# Patient Record
Sex: MALE | Race: WHITE | HISPANIC OR LATINO
[De-identification: names, ages, dates, MRNs, and addresses within clinical notes are randomized per-mention and may not be internally consistent; named-entity substitution may affect disease eponyms.]

---

## 2022-10-17 PROBLEM — Z00.00 ENCOUNTER FOR PREVENTIVE HEALTH EXAMINATION: Status: ACTIVE | Noted: 2022-10-17

## 2022-10-24 ENCOUNTER — APPOINTMENT (OUTPATIENT)
Dept: CARDIOLOGY | Facility: CLINIC | Age: 77
End: 2022-10-24

## 2022-10-24 VITALS — HEIGHT: 67 IN | BODY MASS INDEX: 25.27 KG/M2 | WEIGHT: 161 LBS

## 2022-10-24 PROCEDURE — 99214 OFFICE O/P EST MOD 30 MIN: CPT

## 2022-10-24 NOTE — PHYSICAL EXAM
[Normal Venous Pressure] : normal venous pressure [Normal S1, S2] : normal S1, S2 [Murmur] : murmur [Clear Lung Fields] : clear lung fields [Soft] : abdomen soft [de-identified] : myesha

## 2022-10-24 NOTE — HISTORY OF PRESENT ILLNESS
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 \par \par 3/22: HDL 30 AND ldL 59 pt denies any symptoms prior to bypass. \par \par pt says biking during the summer 5 6 miles 2 times a week on flat ground, pt had no issues with this. pt wife with alzheimers and not getting aides to work with her.

## 2022-11-07 ENCOUNTER — NON-APPOINTMENT (OUTPATIENT)
Age: 77
End: 2022-11-07

## 2023-02-01 ENCOUNTER — APPOINTMENT (OUTPATIENT)
Dept: CARDIOLOGY | Facility: CLINIC | Age: 78
End: 2023-02-01

## 2023-02-24 ENCOUNTER — APPOINTMENT (OUTPATIENT)
Dept: CARDIOLOGY | Facility: CLINIC | Age: 78
End: 2023-02-24
Payer: MEDICARE

## 2023-02-24 VITALS — DIASTOLIC BLOOD PRESSURE: 80 MMHG | SYSTOLIC BLOOD PRESSURE: 120 MMHG | HEART RATE: 80 BPM

## 2023-02-24 VITALS — HEIGHT: 67 IN | BODY MASS INDEX: 25.74 KG/M2 | WEIGHT: 164 LBS

## 2023-02-24 PROCEDURE — 99214 OFFICE O/P EST MOD 30 MIN: CPT

## 2023-02-24 RX ORDER — SIMVASTATIN 20 MG/1
20 TABLET, FILM COATED ORAL
Refills: 0 | Status: DISCONTINUED | COMMUNITY
End: 2023-02-24

## 2023-02-24 NOTE — HISTORY OF PRESENT ILLNESS
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 \par \par 3/22: HDL 30 AND ldL 59 pt denies any symptoms prior to bypass. \par \par pt says biking during the summer 5 6 miles 2 times a week on flat ground, pt had no issues with this. pt wife with alzheimers and not getting aides to work with her.  \par \par 2/24/23: pt did not get echo done, pt not sure why, pt says bikes in march, pt uses a cane to walk and mostly sedantary now. pt has old ypass grafts. \par pt takes care of wife with alzheimers. \par  and HDL 31 start atorvastatin ,pt stopped simvastatin on his own

## 2023-02-24 NOTE — DISCUSSION/SUMMARY
[FreeTextEntry1] : ldl 59 and HDl 30 target 40 \par pt says exercising but did not repeat bloodwork \par \par f/u in 4 months\par \par 2/24/23: will get copy of bloodwork \par get lexiscan stress nuclear as old bypass grafts \par get 2 d echo

## 2023-02-24 NOTE — PHYSICAL EXAM
[Normal Venous Pressure] : normal venous pressure [Normal S1, S2] : normal S1, S2 [Murmur] : murmur [Clear Lung Fields] : clear lung fields [Soft] : abdomen soft [de-identified] : myesha

## 2023-03-20 ENCOUNTER — OUTPATIENT (OUTPATIENT)
Dept: OUTPATIENT SERVICES | Facility: HOSPITAL | Age: 78
LOS: 1 days | End: 2023-03-20
Payer: MEDICARE

## 2023-03-20 ENCOUNTER — RESULT REVIEW (OUTPATIENT)
Age: 78
End: 2023-03-20

## 2023-03-20 DIAGNOSIS — E78.2 MIXED HYPERLIPIDEMIA: ICD-10-CM

## 2023-03-20 DIAGNOSIS — Z00.8 ENCOUNTER FOR OTHER GENERAL EXAMINATION: ICD-10-CM

## 2023-03-20 PROCEDURE — 78452 HT MUSCLE IMAGE SPECT MULT: CPT | Mod: 26

## 2023-03-20 PROCEDURE — A9500: CPT

## 2023-03-20 PROCEDURE — 78452 HT MUSCLE IMAGE SPECT MULT: CPT

## 2023-03-20 PROCEDURE — 93018 CV STRESS TEST I&R ONLY: CPT

## 2023-03-21 DIAGNOSIS — E78.2 MIXED HYPERLIPIDEMIA: ICD-10-CM

## 2023-03-30 DIAGNOSIS — I25.10 ATHEROSCLEROTIC HEART DISEASE OF NATIVE CORONARY ARTERY WITHOUT ANGINA PECTORIS: ICD-10-CM

## 2023-03-31 DIAGNOSIS — I25.10 ATHEROSCLEROTIC HEART DISEASE OF NATIVE CORONARY ARTERY WITHOUT ANGINA PECTORIS: ICD-10-CM

## 2023-06-02 ENCOUNTER — APPOINTMENT (OUTPATIENT)
Dept: CARDIOLOGY | Facility: CLINIC | Age: 78
End: 2023-06-02

## 2023-07-05 ENCOUNTER — APPOINTMENT (OUTPATIENT)
Dept: CARDIOLOGY | Facility: CLINIC | Age: 78
End: 2023-07-05
Payer: MEDICARE

## 2023-07-05 PROCEDURE — 93306 TTE W/DOPPLER COMPLETE: CPT

## 2023-07-10 ENCOUNTER — RX RENEWAL (OUTPATIENT)
Age: 78
End: 2023-07-10

## 2023-07-10 ENCOUNTER — APPOINTMENT (OUTPATIENT)
Dept: CARDIOLOGY | Facility: CLINIC | Age: 78
End: 2023-07-10
Payer: MEDICARE

## 2023-07-10 VITALS — WEIGHT: 159 LBS | BODY MASS INDEX: 24.96 KG/M2 | HEIGHT: 67 IN

## 2023-07-10 VITALS — SYSTOLIC BLOOD PRESSURE: 126 MMHG | DIASTOLIC BLOOD PRESSURE: 70 MMHG | HEART RATE: 85 BPM

## 2023-07-10 DIAGNOSIS — H54.7 UNSPECIFIED VISUAL LOSS: ICD-10-CM

## 2023-07-10 DIAGNOSIS — R94.39 ABNORMAL RESULT OF OTHER CARDIOVASCULAR FUNCTION STUDY: ICD-10-CM

## 2023-07-10 PROCEDURE — 99214 OFFICE O/P EST MOD 30 MIN: CPT

## 2023-07-10 RX ORDER — ASPIRIN 81 MG/1
81 TABLET ORAL
Refills: 0 | Status: ACTIVE | COMMUNITY

## 2023-07-10 RX ORDER — VALSARTAN 40 MG/1
40 TABLET, COATED ORAL DAILY
Qty: 90 | Refills: 3 | Status: ACTIVE | COMMUNITY
Start: 2023-07-10 | End: 1900-01-01

## 2023-07-10 RX ORDER — METOPROLOL SUCCINATE 50 MG/1
50 TABLET, EXTENDED RELEASE ORAL
Qty: 90 | Refills: 3 | Status: ACTIVE | COMMUNITY
Start: 2023-07-10 | End: 1900-01-01

## 2023-07-10 NOTE — CARDIOLOGY SUMMARY
[de-identified] : 3/21/23: There is a medium-sized perfusion defect involving the basal inferior, basal to mid inferolateral and mid anterolateral walls of the left ventricle. On the resting images, this defect is partially reversible suggesting ischemia. LVEF is 60%. This is a low risk positive result.

## 2023-07-10 NOTE — PHYSICAL EXAM
[Normal Venous Pressure] : normal venous pressure [Normal S1, S2] : normal S1, S2 [Murmur] : murmur [Clear Lung Fields] : clear lung fields [Soft] : abdomen soft [de-identified] : myesha

## 2023-07-10 NOTE — HISTORY OF PRESENT ILLNESS
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 LOW HDL, LVH, LVEF 44%\par \par 3/22: HDL 30 AND ldL 59 pt denies any symptoms prior to bypass. \par \par pt says biking during the summer 5 6 miles 2 times a week on flat ground, pt had no issues with this. pt wife with alzheimers and not getting aides to work with her.  \par \par 2/24/23: pt did not get echo done, pt not sure why, pt says bikes in march, pt uses a cane to walk and mostly sedantary now. pt has old bypass grafts. \par pt takes care of wife with alzheimers. \par  and HDL 31 start atorvastatin ,pt stopped simvastatin on his own \par \par 7/10/23:\par 3/21/23: There is a medium-sized perfusion defect involving the basal inferior, basal to mid inferolateral and mid anterolateral walls of the left ventricle. On the resting images, this defect is PARTIALLY REVERSIBLE ischemia. LVEF is 60%. This is a low risk positive result.\par 7/5/23: LVEF 44%, e wave: 0.5 m/s, e' sept: 0.05 m/s, E/e': 11.3 CO: 5.1 l/min LAE, AMVL prolapse, eccentric mr LVH \par Pt using cane for bad knees, pt does not walk fast. pt not sob but not very active.

## 2023-07-10 NOTE — DISCUSSION/SUMMARY
[FreeTextEntry1] : ldl 59 and HDl 30 target 40 \par \par \par 7/10/23: pt with abnormal stress test small area of ischemia with old grafts, \par low e wave increase hydration \par start beta blockers if patient agrees d/w patient at length. \par get bloodwork \par f/u in 4 months \par pt only drinking 4 glasses/day and told to increase hydration. \par start valsartan 40 mg po daily and metoprolol er 50 mg po qd as lv dysfunction \par if symptoms will get cath.

## 2023-11-01 ENCOUNTER — APPOINTMENT (OUTPATIENT)
Dept: CARDIOLOGY | Facility: CLINIC | Age: 78
End: 2023-11-01
Payer: MEDICARE

## 2023-11-01 PROCEDURE — 93880 EXTRACRANIAL BILAT STUDY: CPT

## 2023-11-07 ENCOUNTER — APPOINTMENT (OUTPATIENT)
Dept: CARDIOLOGY | Facility: CLINIC | Age: 78
End: 2023-11-07
Payer: MEDICARE

## 2023-11-07 VITALS — BODY MASS INDEX: 27.47 KG/M2 | HEIGHT: 67 IN | WEIGHT: 175 LBS

## 2023-11-07 PROCEDURE — 99214 OFFICE O/P EST MOD 30 MIN: CPT

## 2023-12-05 ENCOUNTER — APPOINTMENT (OUTPATIENT)
Dept: CARDIOLOGY | Facility: CLINIC | Age: 78
End: 2023-12-05

## 2024-03-25 ENCOUNTER — APPOINTMENT (OUTPATIENT)
Dept: CARDIOLOGY | Facility: CLINIC | Age: 79
End: 2024-03-25

## 2024-03-25 DIAGNOSIS — Z91.199 PATIENT'S NONCOMPLIANCE WITH OTHER MEDICAL TREATMENT AND REGIMEN DUE TO UNSPECIFIED REASON: ICD-10-CM

## 2024-03-25 DIAGNOSIS — I51.7 CARDIOMEGALY: ICD-10-CM

## 2024-03-25 DIAGNOSIS — I25.2 OLD MYOCARDIAL INFARCTION: ICD-10-CM

## 2024-03-25 DIAGNOSIS — E11.9 TYPE 2 DIABETES MELLITUS W/OUT COMPLICATIONS: ICD-10-CM

## 2024-03-25 DIAGNOSIS — I51.9 HEART DISEASE, UNSPECIFIED: ICD-10-CM

## 2024-03-25 DIAGNOSIS — I10 ESSENTIAL (PRIMARY) HYPERTENSION: ICD-10-CM

## 2024-03-25 DIAGNOSIS — I25.810 ATHEROSCLEROSIS OF CORONARY ARTERY BYPASS GRAFT(S) W/OUT ANGINA PECTORIS: ICD-10-CM

## 2024-03-25 DIAGNOSIS — E78.2 MIXED HYPERLIPIDEMIA: ICD-10-CM

## 2024-03-25 DIAGNOSIS — I65.23 OCCLUSION AND STENOSIS OF BILATERAL CAROTID ARTERIES: ICD-10-CM

## 2024-03-25 NOTE — HISTORY OF PRESENT ILLNESS
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 LOW HDL, USES A CANE OA, MODERATE CAROTID DZ   3/22: HDL 30 AND ldL 59 pt denies any symptoms prior to bypass.  pt says biking during the summer 5 to 6 miles 2 times a week on flat ground,  pt wife with alzheimers and not getting aides to work with her.     and HDL 31 start atorvastatin ,pt stopped simvastatin on his own   7/10/23: 3/21/23: nuclear: PARTIALLY REVERSIBLE ischemia. LVEF is 60%. This is a low risk positive result. 7/5/23: LVEF 44%, e wave: 0.5 m/s, e' sept: 0.05 m/s, E/e': 11.3 CO: 5.1 l/min LAE, AMVL prolapse, eccentric mr LVH  Pt using cane for bad knees, pt does not walk fast. pt not sob but not very active.   11/7/23: 11/1/23: Carotid: less than 50% b/l ica, moderate intraluminal irregular calcified and noncalcified plaque DAVID, elevated velocity right ECA.  pt on valsartan and bp is improved today, pt walked 15 minutes to get here sweating and feels ok.  pt tolerating atorvastatin with no problems.   3/25/24: NS on 12/5 patient was supposed to f/u in one month to f/u bloodwork to optimize meds  labs

## 2024-03-25 NOTE — PHYSICAL EXAM
[Normal Venous Pressure] : normal venous pressure [Normal S1, S2] : normal S1, S2 [Murmur] : murmur [Clear Lung Fields] : clear lung fields [Soft] : abdomen soft [de-identified] : myesha

## 2024-03-25 NOTE — DISCUSSION/SUMMARY
[FreeTextEntry1] : pt with abnormal stress test small area of ischemia with old grafts,  low e wave 23 increase hydration  pt only drinking 4 glasses/day  continue metoprolol er 50 mg po qd as lv dysfunction  if symptoms will get cath.  d/w pt at length about noncompliance  pt needs LDL less than 55  continue atorvastatin 20 mg po qhs  continue valsartan 40  continue aspirin 81  continue metoprolol er 50  get bloodwork 2 D echo f/u in 4 months

## 2024-03-25 NOTE — CARDIOLOGY SUMMARY
[de-identified] : 3/21/23: There is a medium-sized perfusion defect involving the basal inferior, basal to mid inferolateral and mid anterolateral walls of the left ventricle. On the resting images, this defect is partially reversible suggesting ischemia. LVEF is 60%. This is a low risk positive result.

## 2024-04-23 ENCOUNTER — RX RENEWAL (OUTPATIENT)
Age: 79
End: 2024-04-23

## 2024-04-23 RX ORDER — ATORVASTATIN CALCIUM 20 MG/1
20 TABLET, FILM COATED ORAL
Qty: 90 | Refills: 3 | Status: ACTIVE | COMMUNITY
Start: 2023-02-24 | End: 1900-01-01

## 2024-07-12 ENCOUNTER — INPATIENT (INPATIENT)
Facility: HOSPITAL | Age: 79
LOS: 2 days | Discharge: HOME HEALTH | DRG: 184 | End: 2024-07-15
Attending: SURGERY | Admitting: SURGERY
Payer: MEDICARE

## 2024-07-12 VITALS
OXYGEN SATURATION: 98 % | RESPIRATION RATE: 16 BRPM | WEIGHT: 175.05 LBS | HEART RATE: 79 BPM | SYSTOLIC BLOOD PRESSURE: 181 MMHG | DIASTOLIC BLOOD PRESSURE: 104 MMHG | TEMPERATURE: 98 F

## 2024-07-12 DIAGNOSIS — Z95.1 PRESENCE OF AORTOCORONARY BYPASS GRAFT: Chronic | ICD-10-CM

## 2024-07-12 LAB
ALBUMIN SERPL ELPH-MCNC: 4.5 G/DL — SIGNIFICANT CHANGE UP (ref 3.5–5.2)
ALP SERPL-CCNC: 53 U/L — SIGNIFICANT CHANGE UP (ref 30–115)
ALT FLD-CCNC: 26 U/L — SIGNIFICANT CHANGE UP (ref 0–41)
ANION GAP SERPL CALC-SCNC: 16 MMOL/L — HIGH (ref 7–14)
APPEARANCE UR: CLEAR — SIGNIFICANT CHANGE UP
AST SERPL-CCNC: 29 U/L — SIGNIFICANT CHANGE UP (ref 0–41)
BASOPHILS # BLD AUTO: 0.06 K/UL — SIGNIFICANT CHANGE UP (ref 0–0.2)
BASOPHILS NFR BLD AUTO: 0.5 % — SIGNIFICANT CHANGE UP (ref 0–1)
BILIRUB SERPL-MCNC: 0.5 MG/DL — SIGNIFICANT CHANGE UP (ref 0.2–1.2)
BILIRUB UR-MCNC: NEGATIVE — SIGNIFICANT CHANGE UP
BUN SERPL-MCNC: 17 MG/DL — SIGNIFICANT CHANGE UP (ref 10–20)
CALCIUM SERPL-MCNC: 9.5 MG/DL — SIGNIFICANT CHANGE UP (ref 8.4–10.5)
CHLORIDE SERPL-SCNC: 103 MMOL/L — SIGNIFICANT CHANGE UP (ref 98–110)
CO2 SERPL-SCNC: 19 MMOL/L — SIGNIFICANT CHANGE UP (ref 17–32)
COLOR SPEC: YELLOW — SIGNIFICANT CHANGE UP
CREAT SERPL-MCNC: 1.2 MG/DL — SIGNIFICANT CHANGE UP (ref 0.7–1.5)
DIFF PNL FLD: NEGATIVE — SIGNIFICANT CHANGE UP
EGFR: 62 ML/MIN/1.73M2 — SIGNIFICANT CHANGE UP
EOSINOPHIL # BLD AUTO: 0.01 K/UL — SIGNIFICANT CHANGE UP (ref 0–0.7)
EOSINOPHIL NFR BLD AUTO: 0.1 % — SIGNIFICANT CHANGE UP (ref 0–8)
GLUCOSE SERPL-MCNC: 173 MG/DL — HIGH (ref 70–99)
GLUCOSE UR QL: 100 MG/DL
HCT VFR BLD CALC: 40.2 % — LOW (ref 42–52)
HGB BLD-MCNC: 13.7 G/DL — LOW (ref 14–18)
IMM GRANULOCYTES NFR BLD AUTO: 0.6 % — HIGH (ref 0.1–0.3)
KETONES UR-MCNC: ABNORMAL MG/DL
LEUKOCYTE ESTERASE UR-ACNC: NEGATIVE — SIGNIFICANT CHANGE UP
LYMPHOCYTES # BLD AUTO: 1.05 K/UL — LOW (ref 1.2–3.4)
LYMPHOCYTES # BLD AUTO: 9.1 % — LOW (ref 20.5–51.1)
MCHC RBC-ENTMCNC: 30.8 PG — SIGNIFICANT CHANGE UP (ref 27–31)
MCHC RBC-ENTMCNC: 34.1 G/DL — SIGNIFICANT CHANGE UP (ref 32–37)
MCV RBC AUTO: 90.3 FL — SIGNIFICANT CHANGE UP (ref 80–94)
MONOCYTES # BLD AUTO: 0.52 K/UL — SIGNIFICANT CHANGE UP (ref 0.1–0.6)
MONOCYTES NFR BLD AUTO: 4.5 % — SIGNIFICANT CHANGE UP (ref 1.7–9.3)
NEUTROPHILS # BLD AUTO: 9.77 K/UL — HIGH (ref 1.4–6.5)
NEUTROPHILS NFR BLD AUTO: 85.2 % — HIGH (ref 42.2–75.2)
NITRITE UR-MCNC: NEGATIVE — SIGNIFICANT CHANGE UP
NRBC # BLD: 0 /100 WBCS — SIGNIFICANT CHANGE UP (ref 0–0)
PH UR: 6 — SIGNIFICANT CHANGE UP (ref 5–8)
PLATELET # BLD AUTO: 199 K/UL — SIGNIFICANT CHANGE UP (ref 130–400)
PMV BLD: 11.3 FL — HIGH (ref 7.4–10.4)
POTASSIUM SERPL-MCNC: 5.2 MMOL/L — HIGH (ref 3.5–5)
POTASSIUM SERPL-SCNC: 5.2 MMOL/L — HIGH (ref 3.5–5)
PROT SERPL-MCNC: 7.1 G/DL — SIGNIFICANT CHANGE UP (ref 6–8)
PROT UR-MCNC: SIGNIFICANT CHANGE UP MG/DL
RBC # BLD: 4.45 M/UL — LOW (ref 4.7–6.1)
RBC # FLD: 13.6 % — SIGNIFICANT CHANGE UP (ref 11.5–14.5)
SODIUM SERPL-SCNC: 138 MMOL/L — SIGNIFICANT CHANGE UP (ref 135–146)
SP GR SPEC: >1.03 — HIGH (ref 1–1.03)
UROBILINOGEN FLD QL: 0.2 MG/DL — SIGNIFICANT CHANGE UP (ref 0.2–1)
WBC # BLD: 11.48 K/UL — HIGH (ref 4.8–10.8)
WBC # FLD AUTO: 11.48 K/UL — HIGH (ref 4.8–10.8)

## 2024-07-12 PROCEDURE — 99285 EMERGENCY DEPT VISIT HI MDM: CPT

## 2024-07-12 PROCEDURE — 72125 CT NECK SPINE W/O DYE: CPT | Mod: 26,MC

## 2024-07-12 PROCEDURE — 74177 CT ABD & PELVIS W/CONTRAST: CPT | Mod: 26,MC

## 2024-07-12 PROCEDURE — 71260 CT THORAX DX C+: CPT | Mod: 26,MC

## 2024-07-12 PROCEDURE — 70450 CT HEAD/BRAIN W/O DYE: CPT | Mod: 26,MC

## 2024-07-12 RX ORDER — MORPHINE SULFATE 100 MG/1
4 TABLET, EXTENDED RELEASE ORAL ONCE
Refills: 0 | Status: DISCONTINUED | OUTPATIENT
Start: 2024-07-12 | End: 2024-07-12

## 2024-07-12 RX ORDER — METOPROLOL TARTRATE 50 MG
50 TABLET ORAL DAILY
Refills: 0 | Status: DISCONTINUED | OUTPATIENT
Start: 2024-07-12 | End: 2024-07-13

## 2024-07-12 RX ORDER — ATORVASTATIN CALCIUM 20 MG/1
20 TABLET, FILM COATED ORAL AT BEDTIME
Refills: 0 | Status: DISCONTINUED | OUTPATIENT
Start: 2024-07-12 | End: 2024-07-15

## 2024-07-12 RX ORDER — TAMSULOSIN HYDROCHLORIDE 0.4 MG/1
0.8 CAPSULE ORAL AT BEDTIME
Refills: 0 | Status: DISCONTINUED | OUTPATIENT
Start: 2024-07-12 | End: 2024-07-13

## 2024-07-12 RX ORDER — VALSARTAN 320 MG/1
40 TABLET ORAL DAILY
Refills: 0 | Status: DISCONTINUED | OUTPATIENT
Start: 2024-07-12 | End: 2024-07-13

## 2024-07-12 RX ADMIN — MORPHINE SULFATE 4 MILLIGRAM(S): 100 TABLET, EXTENDED RELEASE ORAL at 19:46

## 2024-07-12 NOTE — CONSULT NOTE ADULT - ASSESSMENT
Assessment & Plan    78y Male  s/p mechanical fall with R 5-10 rib fractures, and R renal hemorrhagic lesion.    NEURO:  #Acute pain    -Tylenol 650 q6 ATC    -Lidocaine patch    -Robaxin 500 q8    -Gabapentin 100 q8    -Dilaudid 0.25 q4 prn for moderate pain    -Dilaudid 0.5 q4 prn for severe pain     RESP:   #Acute displaced R 5-10 rib fractures     -Pain control     -Encourage IS, currently pulling 1L on IS    -Respiratory monitoring  #Oxygenation    -Saturating well on RA  #Activity    -increase as tolerated    CARDS:   #Hx HTN    -Continue home valsartan 40 qD     -Continue home metoprolol  25 BID (home 50mg ER)    -Follows with cardiology Dr. Carrasco  #Hx CABG x4 2010    -Non-compliant with ASA    -By Dr. Castillo   #Hx HLD    -Continue atorvastatin 20mg qD  #Hx MI  Rx-metoprolol tartrate 25 BID  valsartan 40 daily    GI/NUTR:   #Diet except meds  #GI Prophylaxis    -not indicated  #Bowel regimen    -senna    -last bowel movement unknown    /RENAL:   #R renal hemorrhagic lesion versus traumatic lesional rupture    -Hemoglobin monitoring q6     -UOP/ creatinine monitoring   #Hx BPH    -Continue alfuzosin 10mg     -Follows outpatient with Dr. Charles- urology   #Hyperkalemia on admission    -Insulin/dextrose, repeat BMP in AM    Labs:   BUN/Cr- 17/1.2  -->  Electrolytes-(07-12 @ 19:52)Na 138 // K 5.2 // Mg -- // Phos --   #maintain euvolemia    -LR @120             HEME/ONC:   #DVT prophylaxis    -Holding until stable Hgb      Labs: Hb/Hct:  13.7/40.2  (07-12 @ 19:52)  -->                      Plts:  199  -->                 PTT/INR:        ID:  #leukocytosis   WBC- 11.48  --->>  Temp trend- 24hrs T(F): 99.6 (07-13 @ 00:08), Max: 99.6 (07-13 @ 00:08)  Current antibiotics- n/a      ENDO:  #Hx DM    -Holding home Metformin    -FSG q6 while NPO    -Glucose goal 140-180    -ISS    MSK:     Activity - Ambulate as Tolerated:     Time/Priority:  Routine (07-13-24 @ 01:12) [Active]        LINES/DRAINS:  PIV    ADVANCED DIRECTIVES:  Full Code    HCP/Emergency Contact-    INDICATION FOR SICU: Respiratory monitoring in the setting of R rib rib fractures 5-10, R renal hemorrhagic lesion     DISPO:   SICU. Case discussed with attending Dr. Ravi Assessment & Plan    78y Male  s/p mechanical fall with R 5-10 rib fractures, and R renal hemorrhagic lesion.    NEURO:  #Acute pain    -Tylenol 650 q6 ATC    -Lidocaine patch    -Robaxin 500 q8    -Gabapentin 100 q8    -Dilaudid 0.25 q4 prn for moderate pain    -Dilaudid 0.5 q4 prn for severe pain     RESP:   #Acute displaced R 5-10 rib fractures     -Pain control     -Encourage IS, currently pulling 1L on IS    -Respiratory monitoring  #Oxygenation    -Saturating well on RA  #Activity    -increase as tolerated    CARDS:   #Hx HTN    -Continue home valsartan 40 qD     -Continue home metoprolol  25 BID (home 50mg ER)    -Follows with cardiology Dr. Carrasco  #Hx CABG x4 2010    -Non-compliant with ASA    -By Dr. Castillo   #Hx HLD    -Continue atorvastatin 20mg qD  #Hx MI  Rx-metoprolol tartrate 25 BID  valsartan 40 daily    GI/NUTR:   #Diet NPO except meds  #GI Prophylaxis    -not indicated  #Bowel regimen    -senna    -last bowel movement unknown    /RENAL:   #R renal hemorrhagic lesion versus traumatic lesional rupture    -Hemoglobin monitoring q6     -UOP/ creatinine monitoring   #Hx BPH    -Continue alfuzosin 10mg     -Follows outpatient with Dr. Charles- urology   #Hyperkalemia on admission    -Insulin/dextrose, repeat BMP in AM    Labs:   BUN/Cr- 17/1.2  -->  Electrolytes-(07-12 @ 19:52)Na 138 // K 5.2 // Mg -- // Phos --   #maintain euvolemia    -LR @120             HEME/ONC:   #DVT prophylaxis    -Holding until stable Hgb      Labs: Hb/Hct:  13.7/40.2  (07-12 @ 19:52)  -->                      Plts:  199  -->                 PTT/INR:        ID:  #leukocytosis   WBC- 11.48  --->>  Temp trend- 24hrs T(F): 99.6 (07-13 @ 00:08), Max: 99.6 (07-13 @ 00:08)  Current antibiotics- n/a      ENDO:  #Hx DM    -Holding home Metformin    -FSG q6 while NPO    -Glucose goal 140-180    -ISS    MSK:     Activity - Ambulate as Tolerated:     Time/Priority:  Routine (07-13-24 @ 01:12) [Active]        LINES/DRAINS:  PIV    ADVANCED DIRECTIVES:  Full Code    HCP/Emergency Contact-    INDICATION FOR SICU: Respiratory monitoring in the setting of R rib rib fractures 5-10, R renal hemorrhagic lesion     DISPO:   SICU. Case discussed with attending Dr. Ravi Assessment & Plan    78y Male  s/p mechanical fall with R 5-10 rib fractures, and R renal hemorrhagic lesion.    NEURO:  #Acute pain    -Tylenol 650 q6 ATC    -Lidocaine patch    -Robaxin 500 q8    -Gabapentin 100 q8    -Dilaudid 0.25 q4 prn for moderate pain    -Dilaudid 0.5 q4 prn for severe pain     RESP:   #Acute displaced R 5-10 rib fractures     -Pain control     -Encourage IS, currently pulling 1L on IS    -Respiratory monitoring  #Oxygenation    -Saturating well on RA  #Activity    -increase as tolerated    CARDS:   #Hx HTN    -Continue home valsartan 40 qD     -Continue home metoprolol  25 BID (home 50mg ER)    -Follows with cardiology Dr. Carrasco  #Hx CABG x4 2010    -Non-compliant with ASA    -By Dr. Castillo   #Hx HLD    -Continue atorvastatin 20mg qD  #Hx MI  Rx-metoprolol tartrate 25 BID  valsartan 40 daily    GI/NUTR:   #Diet NPO except meds  #GI Prophylaxis    -not indicated  #Bowel regimen    -senna    -last bowel movement unknown    /RENAL:   #R renal hemorrhagic lesion versus traumatic lesional rupture    -Hemoglobin monitoring q6     -UOP/ creatinine monitoring   #Hx BPH    -Continue alfuzosin 10mg (interchange with tamsulosin)    -Follows outpatient with Dr. Charles- urology   #Hyperkalemia on admission    -Insulin/dextrose, repeat BMP in AM    Labs:   BUN/Cr- 17/1.2  -->  Electrolytes-(07-12 @ 19:52)Na 138 // K 5.2 // Mg -- // Phos --   #maintain euvolemia    -LR @120             HEME/ONC:   #DVT prophylaxis    -Holding until stable Hgb      Labs: Hb/Hct:  13.7/40.2  (07-12 @ 19:52)  -->                      Plts:  199  -->                 PTT/INR:        ID:  #leukocytosis   WBC- 11.48  --->>  Temp trend- 24hrs T(F): 99.6 (07-13 @ 00:08), Max: 99.6 (07-13 @ 00:08)  Current antibiotics- n/a      ENDO:  #Hx DM    -Holding home Metformin    -FSG q6 while NPO    -Glucose goal 140-180    -ISS    MSK:     Activity - Ambulate as Tolerated:     Time/Priority:  Routine (07-13-24 @ 01:12) [Active]        LINES/DRAINS:  PIV    ADVANCED DIRECTIVES:  Full Code    HCP/Emergency Contact-    INDICATION FOR SICU: Respiratory monitoring in the setting of R rib rib fractures 5-10, R renal hemorrhagic lesion     DISPO:   SICU. Case discussed with attending Dr. Ravi

## 2024-07-12 NOTE — H&P ADULT - ATTENDING COMMENTS
Trauma Attending H&P Attestation    Patient seen and evaluated with the trauma team in the trauma bay upon arrival. All pertinent labs and radiographic imaging reviewed, pending final reports. Outpatient medications reviewed, including the presence of anticoagulants, if applicable. I agree with the resident's note above, including the physical exam findings, assessment and plan as documented with the following adjustments.     Trauma Level: [ ] Code  [ ] Alert  [x ] Consult [ ] Transfer in  Patient is seen at the bedside at 22:31  Activation by:  [x ] ED physician [ ] EMS  Intubated in Field? [ ] Yes [x ] No  Intubated in ED? [ ] Yes [x ] No  Intubated in Trauma [place]? [ ] Yes [x ] No    ALEXUS [place] Patient is a 78y old  Male who presents with a chief complaint of S/P slipped and  Fall in the shower and sustained - Multiple Rib Fractures as well right renal cyst rapture with peripheric hematoma   Patient presented with GCS [15 ] AAOx3 upon arrival to the trauma bay.    Allergies  No Known Allergies  Intolerances    PAST MEDICAL & SURGICAL HISTORY:  Diabetes  HTN (hypertension)  HLD (hyperlipidemia)  Acute myocardial infarction  Blindness of right eye  S/P CABG x 4    On AC/Antiplatelets [ ] Yes [x ] No              [ ] NOVACs, [ ] Coumadin, [ ] ASA, [ ] Antiplatelets     Vital Signs Last 24 Hrs  T(C): 36.7 (13 Jul 2024 12:00), Max: 37.6 (13 Jul 2024 00:08)  T(F): 98.1 (13 Jul 2024 12:00), Max: 99.6 (13 Jul 2024 00:08)  HR: 75 (13 Jul 2024 12:00) (60 - 88)  BP: 143/67 (13 Jul 2024 12:00) (112/64 - 184/87)  BP(mean): 91 (13 Jul 2024 12:00) (83 - 127)  RR: 24 (13 Jul 2024 12:00) (16 - 27)  SpO2: 99% (13 Jul 2024 12:00) (96% - 100%)    Parameters below as of 13 Jul 2024 12:00  Patient On (Oxygen Delivery Method): room air    PE: right chest wall tenderness with bruising   Assessment: mechanical fall from standing                             right side closed multiple ribs fractures                             right renal cyst rapture with peripheric hematoma due to trauma                              DM                             HTN                             Frail elderly  PLAN  - Admit to Trauma Service/SICU  - supportive care  - GI/DVT prophylaxis  - pain management multimodal  - Hg/Hct monitoring  - repeat studies as needed  - complete and follow up on trauma work up included but not limites to                          [ x] CXR [x ] PXR [ x] Extremities X-RAYs                          [x ] NCHCT [x ] C-Spine CT [x ] CT Chest [x ] CT Abdomen/Pelvis  [x ] FAST [ ] Other                          [x ] Trauma Labs   [ ] Toxicology    I independently read and reviewed the above studies -> multiple ribs fractures non displaced right side, right renal cyst rapture with small perinephric hematoma,    - Follow up Consults  [ ] Neurosurgery [ ] Orthopaedics [ ] Plastics [ ] Fascial/OMFS [ ] Opthalmology   [ ] Urology  [ ] ENT  [ ] Pediatric ICU                                         [x ] SICU/SDU [ ] Burn/Burn ICU  [x ] Medicine [ ] Geriatrics [ ] Cardiology/EP [ ] Hospice/Palliative Care  [ ] Physical Therapy  - IV ABx give as indicated [ ] Yes [ x] No  - Tetanus given as indicated [ ] Yes [x ] No  - Seizures prophylaxis  [ ] Yes,  [x ] No  - follow up UA for micro hematuria    Bianca Ravi MD, FACS  Trauma/ACS/Surgical Critical Care Attending

## 2024-07-12 NOTE — ED PROVIDER NOTE - PHYSICAL EXAMINATION
CONSTITUTIONAL: Well-appearing; well-nourished; in no apparent distress.   EYES: PERRL; EOM intact.   ENT: normal nose; no rhinorrhea; normal pharynx with no tonsillar hypertrophy.   NECK: Supple; non-tender; no cervical lymphadenopathy.   CARDIOVASCULAR: Normal S1, S2; no murmurs, rubs, or gallops. Equal radial pulses  RESPIRATORY: Normal chest excursion with respiration; breath sounds clear and equal bilaterally; no wheezes, rhonchi, or rales.  GI/: Normal bowel sounds; non-distended; non-tender; no palpable organomegaly.   MS: No evidence of trauma or deformity. no midline spinal ttp. + ttp to rt lateral robs. No crepitus. Normal ROM in all four extremities; non-tender to palpation; distal pulses are normal.   SKIN: Normal for age and race; warm; dry; good turgor; no apparent lesions or exudate.   NEURO/PSYCH: A & O x 4; grossly unremarkable. mood and manner are appropriate. No focal deficits.

## 2024-07-12 NOTE — ED ADULT NURSE NOTE - TEMPLATE LIST FOR HEAD TO TOE ASSESSMENT
Quality 130: Documentation Of Current Medications In The Medical Record: Current Medications Documented Detail Level: Detailed Quality 226: Preventive Care And Screening: Tobacco Use: Screening And Cessation Intervention: Patient screened for tobacco use, is a smoker AND received Cessation Counseling General

## 2024-07-12 NOTE — H&P ADULT - ASSESSMENT
ASSESSMENT:  78yM w/ PMHx of DM, HTN, HLD, MI, 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 +-HT, -LOC, -AC. Trauma assessment in ED: ABCs intact , GCS 15 , AAOx3. Pt states around 3pm he was in the shower when he slipped and fell on soap. Pt states he fell on his right side hitting the edge of the bathtub. Pt states he immediately felt " the wind come out of him" and states he had trouble breathing. Workup in the ED included a CT Chest which revealed acute displaced fractures of the right fifth through 10th lateral ribs. CT scan also shows a right hemorrhagic ruptured renal cyst. Trauma surgery was consulted for evaluation and management of multiple rib fractures.     PLAN:   - Admit to SICU under Dr. Ravi for management of acute displaced fractures of the right fifth through 10th lateral ribs  - Daily AM chest x-ray  - Encourage Incentive spirometer   - Pain Management   - K 5.2, Lok[place] TID   - Continue home medications   - DVT ppx   - Regular diet    Disposition pending results of above labs and imaging  Above plan discussed with Trauma attending, Dr. Ravi, patient, patient family, and ED team  --------------------------------------------------------------------------------------  07-12-24 @ 23:01

## 2024-07-12 NOTE — ED ADULT NURSE NOTE - SUICIDE SCREENING QUESTION 2
Keshav De Leon and Dr. Keshav Guevara at bedside for pelvic exam     Dorina Evans, RN  05/12/22 0894 No

## 2024-07-12 NOTE — H&P ADULT - HISTORY OF PRESENT ILLNESS
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 +-HT, -LOC, -AC. Trauma assessment in ED: ABCs intact , GCS 15 , AAOx3. Pt states around 3pm he was in the shower when he slipped and fell on soap. Pt states he fell on his right side hitting the edge of the bathtub. Pt states he immediately felt " the wind come out of him" and states he had trouble breathing. Patient was able to get up right after the fall and call for help. Patient lives with his wife. Pt states he     ROS: 10-system review is otherwise negative except HPI above.      Primary Survey:    A - airway intact  B - bilateral breath sounds and good chest rise  C - palpable pulses in all extremities  D - GCS 15 on arrival, RAZA  Exposure obtained    Vital Signs Last 24 Hrs  T(C): 36.7 (12 Jul 2024 18:28), Max: 36.7 (12 Jul 2024 18:28)  T(F): 98 (12 Jul 2024 18:28), Max: 98 (12 Jul 2024 18:28)  HR: 79 (12 Jul 2024 18:28) (79 - 79)  BP: 181/104 (12 Jul 2024 18:28) (181/104 - 181/104)  BP(mean): --  RR: 16 (12 Jul 2024 18:28) (16 - 16)  SpO2: 98% (12 Jul 2024 18:28) (98% - 98%)    Parameters below as of 12 Jul 2024 18:28  Patient On (Oxygen Delivery Method): room air      Secondary Survey:   General: NAD  HEENT: Normocephalic, atraumatic, EOMI, PEERLA. no scalp lacerations   Neck: Soft, midline trachea. no c-spine tenderness  Chest: No chest wall tenderness, no subcutaneous emphysema   Cardiac: S1, S2, RRR  Respiratory: Bilateral breath sounds, clear and equal bilaterally  Abdomen: Soft, non-distended, non-tender, no rebound, no guarding.  Groin: Normal appearing, pelvis stable   Ext:  Moving b/l upper and lower extremities. Palpable Radial b/l UE, b/l DP palpable in LE.   Back: No T/L/S spine tenderness, No palpable runoff/stepoff/deformity  Rectal: No alyssa blood, JOSUE with good tone    FAST: *****/Negative    ACCESS / DEVICES:  [ ] Peripheral IV  [ ] Central Venous Line	[ ] R	[ ] L	[ ] IJ	[ ] Fem	[ ] SC	Placed:   [ ] Arterial Line		[ ] R	[ ] L	[ ] Fem	[ ] Rad	[ ] Ax	Placed:   [ ] PICC:					[ ] Mediport  [ ] Urinary Catheter,  Date Placed:   [ ] Chest tube: [ ] Right, [ ] Left  [ ] FRANCOISE/Bill Drains    Labs:  CAPILLARY BLOOD GLUCOSE                              13.7   11.48 )-----------( 199      ( 12 Jul 2024 19:52 )             40.2       Auto Neutrophil %: 85.2 % (07-12-24 @ 19:52)  Auto Immature Granulocyte %: 0.6 % (07-12-24 @ 19:52)    07-12    138  |  103  |  17  ----------------------------<  173<H>  5.2<H>   |  19  |  1.2      Calcium: 9.5 mg/dL (07-12-24 @ 19:52)      LFTs:             7.1  | 0.5  | 29       ------------------[53      ( 12 Jul 2024 19:52 )  4.5  | x    | 26          Lipase:x      Amylase:x             Coags:            Urinalysis Basic - ( 12 Jul 2024 22:47 )    Color: Yellow / Appearance: Clear / SG: >1.030 / pH: x  Gluc: x / Ketone: Trace mg/dL  / Bili: Negative / Urobili: 0.2 mg/dL   Blood: x / Protein: Trace mg/dL / Nitrite: Negative   Leuk Esterase: Negative / RBC: x / WBC x   Sq Epi: x / Non Sq Epi: x / Bacteria: x                RADIOLOGY & ADDITIONAL STUDIES:  ---------------------------------------------------------------------------------------    ASSESSMENT:  78y Male  w/ PMHx of *** seen as (Code Trauma / Trauma Alert / Trauma Consult) s/p ****** with complaint of *** , external signs of trauma include *** . Trauma assessment in ED: ABCs intact , GCS 15 , AAOx3,  RAZA.     Injuries identified:   -   -   -     PLAN:   - Trauma Labs: (CBC, BMP, Coags, T&S, UA, EtOH level)  Additional studies:  EKG  Utox    Trauma Imaging to include the following:  - CXR, Pelvic Xray  - CT Head,  CT C-spine, CT Max/Face, CT Chest, CT Abd/Pelvis  - Extremity films: None    Additional consultations:  - Neurosurgery  - Orthopedics  - OMFS  - PT/Rehab/SW  - Hospitalist/Medicine     Disposition pending results of above labs and imaging  Above plan discussed with Trauma attending,  ***  , patient, patient family, and ED team  --------------------------------------------------------------------------------------  07-12-24 @ 23:01 TRAUMA ACTIVATION LEVEL: ALERT  ACTIVATED BY: ED  INTUBATED: NO    MECHANISM OF INJURY:   [X] Fall	    GCS: 15 	E: 4	V: 5	M: 6    HPI:  78yM w/ PMHx of DM, HTN, HLD, MI, 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 +-HT, -LOC, -AC. Trauma assessment in ED: ABCs intact , GCS 15 , AAOx3. Pt states around 3pm he was in the shower when he slipped and fell on soap. Pt states he fell on his right side hitting the edge of the bathtub. Pt states he immediately felt " the wind come out of him" and states he had trouble breathing. Patient was able to get up right after the fall and call for help. Patient lives with his wife. Pt denies any headache, dizziness, nausea, vomiting, chest pain, or any other symptoms.      ROS: 10-system review is otherwise negative except HPI above.      Primary Survey:    A - airway intact  B - bilateral breath sounds and good chest rise  C - palpable pulses in all extremities  D - GCS 15 on arrival, RAZA  Exposure obtained    Vital Signs Last 24 Hrs  T(C): 36.7 (12 Jul 2024 18:28), Max: 36.7 (12 Jul 2024 18:28)  T(F): 98 (12 Jul 2024 18:28), Max: 98 (12 Jul 2024 18:28)  HR: 79 (12 Jul 2024 18:28) (79 - 79)  BP: 181/104 (12 Jul 2024 18:28) (181/104 - 181/104)  BP(mean): --  RR: 16 (12 Jul 2024 18:28) (16 - 16)  SpO2: 98% (12 Jul 2024 18:28) (98% - 98%)    Parameters below as of 12 Jul 2024 18:28  Patient On (Oxygen Delivery Method): room air      Secondary Survey:   General: NAD  HEENT: Normocephalic, atraumatic, EOMI, PEERLA. no scalp lacerations   Neck: Soft, midline trachea. no c-spine tenderness  Chest: No chest wall tenderness, no subcutaneous emphysema   Cardiac: S1, S2, RRR  Respiratory: Bilateral breath sounds, clear and equal bilaterally  Abdomen: Soft, non-distended, non-tender, no rebound, no guarding.  Groin: Normal appearing, pelvis stable   Ext:  Moving b/l upper and lower extremities. Palpable Radial b/l UE, b/l DP palpable in LE.   Back: No T/L/S spine tenderness, No palpable runoff/stepoff/deformity  Rectal: No alyssa blood, JOSUE with good tone    FAST: *****/Negative    ACCESS / DEVICES:  [ ] Peripheral IV  [ ] Central Venous Line	[ ] R	[ ] L	[ ] IJ	[ ] Fem	[ ] SC	Placed:   [ ] Arterial Line		[ ] R	[ ] L	[ ] Fem	[ ] Rad	[ ] Ax	Placed:   [ ] PICC:					[ ] Mediport  [ ] Urinary Catheter,  Date Placed:   [ ] Chest tube: [ ] Right, [ ] Left  [ ] FRANCOISE/Bill Drains    Labs:  CAPILLARY BLOOD GLUCOSE                              13.7   11.48 )-----------( 199      ( 12 Jul 2024 19:52 )             40.2       Auto Neutrophil %: 85.2 % (07-12-24 @ 19:52)  Auto Immature Granulocyte %: 0.6 % (07-12-24 @ 19:52)    07-12    138  |  103  |  17  ----------------------------<  173<H>  5.2<H>   |  19  |  1.2      Calcium: 9.5 mg/dL (07-12-24 @ 19:52)      LFTs:             7.1  | 0.5  | 29       ------------------[53      ( 12 Jul 2024 19:52 )  4.5  | x    | 26          Lipase:x      Amylase:x             Coags:            Urinalysis Basic - ( 12 Jul 2024 22:47 )    Color: Yellow / Appearance: Clear / SG: >1.030 / pH: x  Gluc: x / Ketone: Trace mg/dL  / Bili: Negative / Urobili: 0.2 mg/dL   Blood: x / Protein: Trace mg/dL / Nitrite: Negative   Leuk Esterase: Negative / RBC: x / WBC x   Sq Epi: x / Non Sq Epi: x / Bacteria: x                RADIOLOGY & ADDITIONAL STUDIES:  ---------------------------------------------------------------------------------------    ASSESSMENT:  78y Male  w/ PMHx of *** seen as (Code Trauma / Trauma Alert / Trauma Consult) s/p ****** with complaint of *** , external signs of trauma include *** . Trauma assessment in ED: ABCs intact , GCS 15 , AAOx3,  RAZA.     Injuries identified:   -   -   -     PLAN:   - Trauma Labs: (CBC, BMP, Coags, T&S, UA, EtOH level)  Additional studies:  EKG  Utox    Trauma Imaging to include the following:  - CXR, Pelvic Xray  - CT Head,  CT C-spine, CT Max/Face, CT Chest, CT Abd/Pelvis  - Extremity films: None    Additional consultations:  - Neurosurgery  - Orthopedics  - OMFS  - PT/Rehab/SW  - Hospitalist/Medicine     Disposition pending results of above labs and imaging  Above plan discussed with Trauma attending,  ***  , patient, patient family, and ED team  --------------------------------------------------------------------------------------  07-12-24 @ 23:01 TRAUMA ACTIVATION LEVEL: ALERT  ACTIVATED BY: ED  INTUBATED: NO    MECHANISM OF INJURY:   [X] Fall	    GCS: 15 	E: 4	V: 5	M: 6    HPI:  78yM w/ PMHx of DM, HTN, HLD, MI, 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 +-HT, -LOC, -AC. Trauma assessment in ED: ABCs intact , GCS 15 , AAOx3. Pt states around 3pm he was in the shower when he slipped and fell on soap. Pt states he fell on his right side hitting the edge of the bathtub. Pt states he immediately felt " the wind come out of him" and states he had trouble breathing. Patient was able to get up right after the fall and call for help. Patient lives with his wife. Pt denies any headache, dizziness, nausea, vomiting, chest pain, or any other symptoms.      ROS: 10-system review is otherwise negative except HPI above.      Primary Survey:    A - airway intact  B - bilateral breath sounds and good chest rise  C - palpable pulses in all extremities  D - GCS 15 on arrival, RAZA  Exposure obtained    Vital Signs Last 24 Hrs  T(C): 36.7 (12 Jul 2024 18:28), Max: 36.7 (12 Jul 2024 18:28)  T(F): 98 (12 Jul 2024 18:28), Max: 98 (12 Jul 2024 18:28)  HR: 79 (12 Jul 2024 18:28) (79 - 79)  BP: 181/104 (12 Jul 2024 18:28) (181/104 - 181/104)  BP(mean): --  RR: 16 (12 Jul 2024 18:28) (16 - 16)  SpO2: 98% (12 Jul 2024 18:28) (98% - 98%)    Parameters below as of 12 Jul 2024 18:28  Patient On (Oxygen Delivery Method): room air      ---------------------------------------------------------------------------------------

## 2024-07-12 NOTE — H&P ADULT - NSHPPHYSICALEXAM_GEN_ALL_CORE
Secondary Survey:   General: NAD  HEENT: Normocephalic, atraumatic, EOMI, PEERLA. no scalp lacerations   Neck: Soft, midline trachea. no c-spine tenderness  Chest: No chest wall tenderness, no subcutaneous emphysema, Tender to palpation in right upper flank.   Cardiac: RRR  Respiratory: Bilateral breath sounds, clear and equal bilaterally  Abdomen: Soft, non-distended, non-tender  Groin: Normal appearing, pelvis stable   Ext:  Moving b/l upper and lower extremities. Palpable Radial b/l UE, b/l DP palpable in LE.   Back: No T/L/S spine tenderness, No palpable runoff/stepoff/deformity

## 2024-07-12 NOTE — ED ADULT NURSE NOTE - OBJECTIVE STATEMENT
Patient is a 78 year old male complaining of rib pain s/p mechanical fall in the bathroom today -AC use, -LOC, -HT

## 2024-07-12 NOTE — H&P ADULT - NSICDXPASTMEDICALHX_GEN_ALL_CORE_FT
PAST MEDICAL HISTORY:  Acute myocardial infarction     Diabetes     HLD (hyperlipidemia)     HTN (hypertension)      PAST MEDICAL HISTORY:  Acute myocardial infarction     Blindness of right eye     Diabetes     HLD (hyperlipidemia)     HTN (hypertension)

## 2024-07-12 NOTE — ED PROVIDER NOTE - ATTENDING APP SHARED VISIT CONTRIBUTION OF CARE
78-year-old male history of hypertension diabetes CAD status post CABG presenting status post mechanical slip and fall in shower.  No head injury, LOC, anticoagulation.  Currently complaining of right-sided rib pain.  Uncomfortable appearing, non toxic. NCAT PERRLA EOMI neck supple non tender normal wob cta bl rrr right anterolateral ribs tenderness palpation, no overlying skin changes abdomen s nt nd no rebound no guarding WWPx4 neuro non focal no midline CTL spine tenderness palpation.

## 2024-07-12 NOTE — ED PROVIDER NOTE - OBJECTIVE STATEMENT
78 year old M with hx of htn, dm, cad s.p cabg presenting to ed with rt sided rib pain s/p fall. Pt sts slipped in the shower at 3pm and hit L side of ribs on bathtub. No head trauma or loc. + pain worse with inspiration and movement. No ac use, chest pain, sob, abd pain, nausea, vomiting, neck or back pain, inability to bare weight or ambulate.

## 2024-07-12 NOTE — CONSULT NOTE ADULT - ATTENDING COMMENTS
Critical Care: [id number]   This patient has a high probability of sudden, clinically significant deterioration, which requires the highest level of physician preparedness to intervene urgently. I managed/supervised life or organ supporting interventions that required frequent physician assessment. I devoted my full attention in the ICU to the direct care of this patient for the period of time indicated below. Time I spent with the family or surrogate(s) is included only if the patient was incapable of providing the necessary information or participating in medical decision making. Time devoted to teaching and to any procedures I billed separately is not included.     CAROLINA VAUGHAN 78y Male admitted to [ x] SICU /[ ] SDU with mechanical fall resulted in multiple right sided ribs fractures, been evaluated for pulmonary contusion, right renal cyst rapture due to trauma with perinephric hematoma. Patient is at risk for hemodynamic instability, airway at risk for obstruction, at risk for increase hemorrhage, electrolytes abnormalities.  Patient is examined and evaluated at the bedside with SICU team. Treatment plan discussed with SICU team, nurses and primary team.   Chest X-ray and all relevant studies reviewed during rounds.  Will continue hemodynamic monitoring as per protocol in SICU.    Neuro:  GCS [15 ] AAOx3  [ x]  Neurovascular checks as per SICU protocol                 [ ] 3% NaCl     [ ] 2% NaCl                [ ] Keppra  [ ] Lamictal  [ ] Depakote  [ ] Dilantin [ x] None                Pharmacological Paralysis [ ] Yes  [ x]  No      Sedated/Pain control with                 [ ] Dilaudid drip, [ ]  Ketamine drip, [ ] Fentanyl drip, [ ] Propofol, [ ] Precedex, [ ] Versed drip, [ ] Ativan drip,                           [ ] OxyContin standing,  [ ] OxyContin PRN, [ ] Dilaudid PRN pushes, [ ] Fentanyl PRN pushes, [ ] PCA,                [x ] Tylenol IV/PO, [ ] Gabapentin, [ ]  Ketorolac, [x ] Tramadol,  [ ] Lidoderm Patch       Other Medications               [ ] Seroquel, [ ] Zyprexa, [ ] Haldol,  [ ] Clonazepam [ ] Xanax, [ ] Versed/Ativan PRN, [ ] Valium [ ] Trazadone [x ] None               [ ] Robaxin   [ ] Baclofen  [ ] Flexeril               [ ] CIWA (Ativan/Valium/ Librium)  CV: continue to monitor, continue home medications  On pressors [ ]  Yes  [x ]  No          [ ]  Levophed, [ ] Hernán-Synephrine, [ ] Vasopressin, [ ]  Epinephrine          [ ] Dobutamine, [ ] Milrinone, [ ]  Midodrine,  [ ] Others    Other Cardiac Meds          [ ] Amiodarone IV/PO, [ ] Digoxin, [ ] Cardizem drip, [ ] Cleviprex drip, [ ] Esmolol drip, [ ] Cardene drip  Respiratory: Acute respiratory insufficiency -> continue monitoring, CXR -> No pneumothorax, lower lobe right side mild contusion                         None Invasive Support  [x ] Incentive Spirometer 1000ml                                 [ ] BiPAP   [ ] CPAP/NIV   [ ] HFNC   [ ] NR Face Mask  [ x] NC  [ ] Trach Caller [ ] Room Air                        Ventilatory support  [ ] Yes [x ] No                            [ ] SBT                                  [ ] PC    [ ] VC   [ ] AC/PRVC   [ ] BiVent/APRV   [ ]CPAP   GI  [x ]  bowel regiment  [x ] BM none [x ] Flatus none  [ ] Ostomy   [ ] NG tube                 Prophylaxis [x ] PPI  [ ] H2 Blockers  [ ] Others  Nutrition: continue   [x ] Diet  will start in AM if no distress [ ] TPN/PPN   [ ] calories count   [ ]  Tube Feeds     Renal: Continue I&Os monitoring, Herrera catheter  [ ] Yes  [ x]  No  [ ] Consideration for discontinuation [ ] Taxes cath/PrimaFit  [ ] TOV                                                               [ ] HD/CVVH   [x ] Urine output       Lytes/Acid-base: replete hypokalemia, hypomagnesemia, hypocalcemia, hypophosphatemia        IV Fluids   [x ] LR@120ml/hr  [ ] NS  [ ] D5W1/2NS [ ] Bicarbonate Drip  [ ] Albumin [ ] Lasix drip/push  [ ] Bumex drip/push  ID: leukocytosis -> continue to monitor:         IV Abx [ ] Yes, [x ] No;  ID consulted [ ] Yes, [x ] No        Cultures send  [ ] Respiratory   [ ] Blood   [ ] Urine  [ ] Fluids  [ ] Tissue  [x ]  None  Lines:   [ ] Right   [ ] Left  [ ] Bilateral                     [ ] Subclavian TLC        [ ]  Internal Jugular TLC     [ ]  Femoral TLC                     [ ] Subclavian Cordis    [ ] Internal Jugular Cordis   [ ] Femoral Cordis                     [ ] HD catheter    [ ] PICC     [ ]  Midline   [ x] Peripheral IVs                                                 [ ] Right   [ ] Left   [x ] None                     [ ] Radial A-Line    [ ] Femoral A-Line   [ ] Axillary A-Line               Heme: continue to evaluate for acute blood loss anemia- trend Hg/Hct                     AC Reversal Indicated [ ] Yes  [x ] No                                      [ ] Kcentra,  [ ] 3Factors concentrate, [ ] Andexxa                     Transfused  indicated  [ ] Yes, [x ] No    [ ] PRBCs   [ ] Platelets   [ ] FFPs   [ ] Cryoprecipitate                    Should be started on or continued with  following  [ ] Yes,  [x ] No                               [ ] Lovenox  [ ] Coumadin  [ ] Heparin drip  [ ] DOVACs  [ ] ASA  [ ] Antiplatelets   Endocrine: Prevent and treat hyperglycemia with insulin as needed,                                [x ] Sliding scale,  [ ] Lantus/Regular Insuline                              Insuline drip for hyperglycemia [ ] Yes, [x ] No   PV: follow pulse exam  Skin: decub precautions  DVT Prophylaxis:  [x ] SCDs  [ ] Heparin SQ  [ x] Lovenox  SQ  [ ] ASA  Stress Gastritis Prophylaxis: PPI/H2 Blockers if indicated  Mobility: patient is evaluated at the bedside with mobility team and the goals for today are discussed with PT [x ] out of bed to chair    PATIENT/FAMILY/SURROGATE CONFERENCE:  [x ] Yes with patient at the bedside. [ ] No  PURPOSE: To obtain necessary information, To discuss treatment options under consideration today.    I saw and evaluated the patient personally. I have reviewed and agree with note above. Treatment plan discussed with SICU team, nurses and primary team at the time of the multidisciplinary rounds. The above note is NOT written at the time of rounds and will reflect all changes throughout management of the patient for the day note is written for.    Bianca Ravi MD, FACS  Trauma/ACS/SCC Attending

## 2024-07-12 NOTE — ED PROVIDER NOTE - CLINICAL SUMMARY MEDICAL DECISION MAKING FREE TEXT BOX
78-year-old male history of hypertension diabetes CAD status post CABG presenting status post mechanical slip and fall in shower.  No head injury, LOC, anticoagulation.  Currently complaining of right-sided rib pain.  Uncomfortable appearing, non toxic. NCAT PERRLA EOMI neck supple non tender normal wob cta bl rrr right anterolateral ribs tenderness palpation, no overlying skin changes abdomen s nt nd no rebound no guarding WWPx4 neuro non focal no midline CTL spine tenderness palpation. labs imaging reviewed. sp sx/icu eval. will admit for further eval

## 2024-07-12 NOTE — H&P ADULT - NSHPLABSRESULTS_GEN_ALL_CORE
Labs:  CAPILLARY BLOOD GLUCOSE                              13.7   11.48 )-----------( 199      ( 12 Jul 2024 19:52 )             40.2       Auto Neutrophil %: 85.2 % (07-12-24 @ 19:52)  Auto Immature Granulocyte %: 0.6 % (07-12-24 @ 19:52)    07-12    138  |  103  |  17  ----------------------------<  173<H>  5.2<H>   |  19  |  1.2      Calcium: 9.5 mg/dL (07-12-24 @ 19:52)      LFTs:             7.1  | 0.5  | 29       ------------------[53      ( 12 Jul 2024 19:52 )  4.5  | x    | 26          Lipase:x      Amylase:x            Coags:        Urinalysis Basic - ( 12 Jul 2024 22:47 )    Color: Yellow / Appearance: Clear / SG: >1.030 / pH: x  Gluc: x / Ketone: Trace mg/dL  / Bili: Negative / Urobili: 0.2 mg/dL   Blood: x / Protein: Trace mg/dL / Nitrite: Negative   Leuk Esterase: Negative / RBC: x / WBC x   Sq Epi: x / Non Sq Epi: x / Bacteria: x      RADIOLOGY & ADDITIONAL STUDIES:  < from: CT Abdomen and Pelvis w/ IV Cont (07.12.24 @ 21:25) >    ABDOMEN/PELVIS:    Mixed density fluid at the right renal lower pole withfocal area of high   density fluid, likely reflecting blood products. Differential includes a   hemorrhagic lesion versus traumatic lesional rupture. No alyssa renal   laceration identified.    < from: CT Chest w/ IV Cont (07.12.24 @ 21:25) >    CHEST:    Acute, displaced fractures of the right fifth through 10th lateral ribs.   No pneumothorax.    Debris within the left main bronchus which can be seen with aspiration.        < from: CT Cervical Spine No Cont (07.12.24 @ 21:22) >    CT HEAD:  No acute intracranial findings.    CT CERVICAL SPINE:  No acute cervical fracture or dislocation.

## 2024-07-12 NOTE — ED ADULT NURSE NOTE - NSFALLHARMRISKINTERV_ED_ALL_ED

## 2024-07-13 DIAGNOSIS — S22.49XA MULTIPLE FRACTURES OF RIBS, UNSPECIFIED SIDE, INITIAL ENCOUNTER FOR CLOSED FRACTURE: ICD-10-CM

## 2024-07-13 LAB
A1C WITH ESTIMATED AVERAGE GLUCOSE RESULT: 8.2 % — HIGH (ref 4–5.6)
ANION GAP SERPL CALC-SCNC: 11 MMOL/L — SIGNIFICANT CHANGE UP (ref 7–14)
ANION GAP SERPL CALC-SCNC: 13 MMOL/L — SIGNIFICANT CHANGE UP (ref 7–14)
ANION GAP SERPL CALC-SCNC: 18 MMOL/L — HIGH (ref 7–14)
BASOPHILS # BLD AUTO: 0.04 K/UL — SIGNIFICANT CHANGE UP (ref 0–0.2)
BASOPHILS # BLD AUTO: 0.05 K/UL — SIGNIFICANT CHANGE UP (ref 0–0.2)
BASOPHILS NFR BLD AUTO: 0.4 % — SIGNIFICANT CHANGE UP (ref 0–1)
BASOPHILS NFR BLD AUTO: 0.5 % — SIGNIFICANT CHANGE UP (ref 0–1)
BUN SERPL-MCNC: 16 MG/DL — SIGNIFICANT CHANGE UP (ref 10–20)
BUN SERPL-MCNC: 17 MG/DL — SIGNIFICANT CHANGE UP (ref 10–20)
BUN SERPL-MCNC: 19 MG/DL — SIGNIFICANT CHANGE UP (ref 10–20)
CALCIUM SERPL-MCNC: 9 MG/DL — SIGNIFICANT CHANGE UP (ref 8.4–10.5)
CALCIUM SERPL-MCNC: 9.2 MG/DL — SIGNIFICANT CHANGE UP (ref 8.4–10.5)
CALCIUM SERPL-MCNC: 9.5 MG/DL — SIGNIFICANT CHANGE UP (ref 8.4–10.5)
CHLORIDE SERPL-SCNC: 101 MMOL/L — SIGNIFICANT CHANGE UP (ref 98–110)
CHLORIDE SERPL-SCNC: 104 MMOL/L — SIGNIFICANT CHANGE UP (ref 98–110)
CHLORIDE SERPL-SCNC: 105 MMOL/L — SIGNIFICANT CHANGE UP (ref 98–110)
CO2 SERPL-SCNC: 16 MMOL/L — LOW (ref 17–32)
CO2 SERPL-SCNC: 20 MMOL/L — SIGNIFICANT CHANGE UP (ref 17–32)
CO2 SERPL-SCNC: 22 MMOL/L — SIGNIFICANT CHANGE UP (ref 17–32)
CREAT SERPL-MCNC: 1.1 MG/DL — SIGNIFICANT CHANGE UP (ref 0.7–1.5)
CREAT SERPL-MCNC: 1.2 MG/DL — SIGNIFICANT CHANGE UP (ref 0.7–1.5)
CREAT SERPL-MCNC: 1.2 MG/DL — SIGNIFICANT CHANGE UP (ref 0.7–1.5)
EGFR: 62 ML/MIN/1.73M2 — SIGNIFICANT CHANGE UP
EGFR: 62 ML/MIN/1.73M2 — SIGNIFICANT CHANGE UP
EGFR: 69 ML/MIN/1.73M2 — SIGNIFICANT CHANGE UP
EOSINOPHIL # BLD AUTO: 0.01 K/UL — SIGNIFICANT CHANGE UP (ref 0–0.7)
EOSINOPHIL # BLD AUTO: 0.1 K/UL — SIGNIFICANT CHANGE UP (ref 0–0.7)
EOSINOPHIL NFR BLD AUTO: 0.1 % — SIGNIFICANT CHANGE UP (ref 0–8)
EOSINOPHIL NFR BLD AUTO: 1.1 % — SIGNIFICANT CHANGE UP (ref 0–8)
ESTIMATED AVERAGE GLUCOSE: 189 MG/DL — HIGH (ref 68–114)
GLUCOSE BLDC GLUCOMTR-MCNC: 150 MG/DL — HIGH (ref 70–99)
GLUCOSE BLDC GLUCOMTR-MCNC: 167 MG/DL — HIGH (ref 70–99)
GLUCOSE BLDC GLUCOMTR-MCNC: 177 MG/DL — HIGH (ref 70–99)
GLUCOSE BLDC GLUCOMTR-MCNC: 195 MG/DL — HIGH (ref 70–99)
GLUCOSE SERPL-MCNC: 191 MG/DL — HIGH (ref 70–99)
GLUCOSE SERPL-MCNC: 202 MG/DL — HIGH (ref 70–99)
GLUCOSE SERPL-MCNC: 213 MG/DL — HIGH (ref 70–99)
HCT VFR BLD CALC: 37.9 % — LOW (ref 42–52)
HCT VFR BLD CALC: 43.1 % — SIGNIFICANT CHANGE UP (ref 42–52)
HGB BLD-MCNC: 12.9 G/DL — LOW (ref 14–18)
HGB BLD-MCNC: 14.4 G/DL — SIGNIFICANT CHANGE UP (ref 14–18)
IMM GRANULOCYTES NFR BLD AUTO: 0.2 % — SIGNIFICANT CHANGE UP (ref 0.1–0.3)
IMM GRANULOCYTES NFR BLD AUTO: 0.5 % — HIGH (ref 0.1–0.3)
LYMPHOCYTES # BLD AUTO: 1.12 K/UL — LOW (ref 1.2–3.4)
LYMPHOCYTES # BLD AUTO: 10.5 % — LOW (ref 20.5–51.1)
LYMPHOCYTES # BLD AUTO: 2.38 K/UL — SIGNIFICANT CHANGE UP (ref 1.2–3.4)
LYMPHOCYTES # BLD AUTO: 25.7 % — SIGNIFICANT CHANGE UP (ref 20.5–51.1)
MAGNESIUM SERPL-MCNC: 1.8 MG/DL — SIGNIFICANT CHANGE UP (ref 1.8–2.4)
MAGNESIUM SERPL-MCNC: 1.9 MG/DL — SIGNIFICANT CHANGE UP (ref 1.8–2.4)
MAGNESIUM SERPL-MCNC: 1.9 MG/DL — SIGNIFICANT CHANGE UP (ref 1.8–2.4)
MCHC RBC-ENTMCNC: 30.6 PG — SIGNIFICANT CHANGE UP (ref 27–31)
MCHC RBC-ENTMCNC: 30.9 PG — SIGNIFICANT CHANGE UP (ref 27–31)
MCHC RBC-ENTMCNC: 33.4 G/DL — SIGNIFICANT CHANGE UP (ref 32–37)
MCHC RBC-ENTMCNC: 34 G/DL — SIGNIFICANT CHANGE UP (ref 32–37)
MCV RBC AUTO: 90.9 FL — SIGNIFICANT CHANGE UP (ref 80–94)
MCV RBC AUTO: 91.5 FL — SIGNIFICANT CHANGE UP (ref 80–94)
MONOCYTES # BLD AUTO: 0.55 K/UL — SIGNIFICANT CHANGE UP (ref 0.1–0.6)
MONOCYTES # BLD AUTO: 0.87 K/UL — HIGH (ref 0.1–0.6)
MONOCYTES NFR BLD AUTO: 5.1 % — SIGNIFICANT CHANGE UP (ref 1.7–9.3)
MONOCYTES NFR BLD AUTO: 9.4 % — HIGH (ref 1.7–9.3)
NEUTROPHILS # BLD AUTO: 5.83 K/UL — SIGNIFICANT CHANGE UP (ref 1.4–6.5)
NEUTROPHILS # BLD AUTO: 8.91 K/UL — HIGH (ref 1.4–6.5)
NEUTROPHILS NFR BLD AUTO: 63.1 % — SIGNIFICANT CHANGE UP (ref 42.2–75.2)
NEUTROPHILS NFR BLD AUTO: 83.4 % — HIGH (ref 42.2–75.2)
NRBC # BLD: 0 /100 WBCS — SIGNIFICANT CHANGE UP (ref 0–0)
NRBC # BLD: 0 /100 WBCS — SIGNIFICANT CHANGE UP (ref 0–0)
PHOSPHATE SERPL-MCNC: 3.2 MG/DL — SIGNIFICANT CHANGE UP (ref 2.1–4.9)
PHOSPHATE SERPL-MCNC: 3.2 MG/DL — SIGNIFICANT CHANGE UP (ref 2.1–4.9)
PHOSPHATE SERPL-MCNC: 3.3 MG/DL — SIGNIFICANT CHANGE UP (ref 2.1–4.9)
PLATELET # BLD AUTO: 184 K/UL — SIGNIFICANT CHANGE UP (ref 130–400)
PLATELET # BLD AUTO: 240 K/UL — SIGNIFICANT CHANGE UP (ref 130–400)
PMV BLD: 11.6 FL — HIGH (ref 7.4–10.4)
PMV BLD: 11.9 FL — HIGH (ref 7.4–10.4)
POTASSIUM SERPL-MCNC: 4.6 MMOL/L — SIGNIFICANT CHANGE UP (ref 3.5–5)
POTASSIUM SERPL-MCNC: 5.1 MMOL/L — HIGH (ref 3.5–5)
POTASSIUM SERPL-MCNC: 5.4 MMOL/L — HIGH (ref 3.5–5)
POTASSIUM SERPL-SCNC: 4.6 MMOL/L — SIGNIFICANT CHANGE UP (ref 3.5–5)
POTASSIUM SERPL-SCNC: 5.1 MMOL/L — HIGH (ref 3.5–5)
POTASSIUM SERPL-SCNC: 5.4 MMOL/L — HIGH (ref 3.5–5)
RBC # BLD: 4.17 M/UL — LOW (ref 4.7–6.1)
RBC # BLD: 4.71 M/UL — SIGNIFICANT CHANGE UP (ref 4.7–6.1)
RBC # FLD: 13.8 % — SIGNIFICANT CHANGE UP (ref 11.5–14.5)
RBC # FLD: 13.9 % — SIGNIFICANT CHANGE UP (ref 11.5–14.5)
SODIUM SERPL-SCNC: 135 MMOL/L — SIGNIFICANT CHANGE UP (ref 135–146)
SODIUM SERPL-SCNC: 137 MMOL/L — SIGNIFICANT CHANGE UP (ref 135–146)
SODIUM SERPL-SCNC: 138 MMOL/L — SIGNIFICANT CHANGE UP (ref 135–146)
WBC # BLD: 10.68 K/UL — SIGNIFICANT CHANGE UP (ref 4.8–10.8)
WBC # BLD: 9.25 K/UL — SIGNIFICANT CHANGE UP (ref 4.8–10.8)
WBC # FLD AUTO: 10.68 K/UL — SIGNIFICANT CHANGE UP (ref 4.8–10.8)
WBC # FLD AUTO: 9.25 K/UL — SIGNIFICANT CHANGE UP (ref 4.8–10.8)

## 2024-07-13 PROCEDURE — 71045 X-RAY EXAM CHEST 1 VIEW: CPT

## 2024-07-13 PROCEDURE — 84100 ASSAY OF PHOSPHORUS: CPT

## 2024-07-13 PROCEDURE — 36415 COLL VENOUS BLD VENIPUNCTURE: CPT

## 2024-07-13 PROCEDURE — 80048 BASIC METABOLIC PNL TOTAL CA: CPT

## 2024-07-13 PROCEDURE — 86901 BLOOD TYPING SEROLOGIC RH(D): CPT

## 2024-07-13 PROCEDURE — 97116 GAIT TRAINING THERAPY: CPT | Mod: GP

## 2024-07-13 PROCEDURE — 83036 HEMOGLOBIN GLYCOSYLATED A1C: CPT

## 2024-07-13 PROCEDURE — 82962 GLUCOSE BLOOD TEST: CPT

## 2024-07-13 PROCEDURE — 71045 X-RAY EXAM CHEST 1 VIEW: CPT | Mod: 26

## 2024-07-13 PROCEDURE — 97162 PT EVAL MOD COMPLEX 30 MIN: CPT | Mod: GP

## 2024-07-13 PROCEDURE — 99291 CRITICAL CARE FIRST HOUR: CPT

## 2024-07-13 PROCEDURE — 86900 BLOOD TYPING SEROLOGIC ABO: CPT

## 2024-07-13 PROCEDURE — 97530 THERAPEUTIC ACTIVITIES: CPT | Mod: GP

## 2024-07-13 PROCEDURE — 83735 ASSAY OF MAGNESIUM: CPT

## 2024-07-13 PROCEDURE — 86850 RBC ANTIBODY SCREEN: CPT

## 2024-07-13 PROCEDURE — 85025 COMPLETE CBC W/AUTO DIFF WBC: CPT

## 2024-07-13 RX ORDER — DEXTROSE 30 % IN WATER 30 %
50 VIAL (ML) INTRAVENOUS ONCE
Refills: 0 | Status: DISCONTINUED | OUTPATIENT
Start: 2024-07-13 | End: 2024-07-13

## 2024-07-13 RX ORDER — SODIUM ZIRCONIUM CYCLOSILICATE 10 G/10G
10 POWDER, FOR SUSPENSION ORAL THREE TIMES A DAY
Refills: 0 | Status: DISCONTINUED | OUTPATIENT
Start: 2024-07-13 | End: 2024-07-13

## 2024-07-13 RX ORDER — INSULIN LISPRO 100 [IU]/ML
INJECTION, SOLUTION SUBCUTANEOUS
Refills: 0 | Status: DISCONTINUED | OUTPATIENT
Start: 2024-07-13 | End: 2024-07-15

## 2024-07-13 RX ORDER — INSULIN LISPRO 100 [IU]/ML
INJECTION, SOLUTION SUBCUTANEOUS EVERY 6 HOURS
Refills: 0 | Status: DISCONTINUED | OUTPATIENT
Start: 2024-07-13 | End: 2024-07-13

## 2024-07-13 RX ORDER — HYDROMORPHONE HCL 0.2 MG/ML
0.5 INJECTION, SOLUTION INTRAVENOUS EVERY 4 HOURS
Refills: 0 | Status: DISCONTINUED | OUTPATIENT
Start: 2024-07-13 | End: 2024-07-13

## 2024-07-13 RX ORDER — ATORVASTATIN CALCIUM 20 MG/1
1 TABLET, FILM COATED ORAL
Refills: 0 | DISCHARGE

## 2024-07-13 RX ORDER — INSULIN REGULAR, HUMAN 100/ML
10 VIAL (ML) INJECTION ONCE
Refills: 0 | Status: DISCONTINUED | OUTPATIENT
Start: 2024-07-13 | End: 2024-07-13

## 2024-07-13 RX ORDER — LIDOCAINE HCL 28 MG/G
1 GEL TOPICAL DAILY
Refills: 0 | Status: DISCONTINUED | OUTPATIENT
Start: 2024-07-13 | End: 2024-07-15

## 2024-07-13 RX ORDER — HYDROMORPHONE HCL 0.2 MG/ML
0.25 INJECTION, SOLUTION INTRAVENOUS EVERY 4 HOURS
Refills: 0 | Status: DISCONTINUED | OUTPATIENT
Start: 2024-07-13 | End: 2024-07-13

## 2024-07-13 RX ORDER — GABAPENTIN
100 POWDER (GRAM) MISCELLANEOUS EVERY 8 HOURS
Refills: 0 | Status: DISCONTINUED | OUTPATIENT
Start: 2024-07-13 | End: 2024-07-15

## 2024-07-13 RX ORDER — ACETAMINOPHEN 325 MG
650 TABLET ORAL EVERY 6 HOURS
Refills: 0 | Status: DISCONTINUED | OUTPATIENT
Start: 2024-07-13 | End: 2024-07-13

## 2024-07-13 RX ORDER — TAMSULOSIN HYDROCHLORIDE 0.4 MG/1
0.8 CAPSULE ORAL AT BEDTIME
Refills: 0 | Status: DISCONTINUED | OUTPATIENT
Start: 2024-07-13 | End: 2024-07-15

## 2024-07-13 RX ORDER — MAGNESIUM SULFATE 100 %
1 POWDER (GRAM) MISCELLANEOUS ONCE
Refills: 0 | Status: COMPLETED | OUTPATIENT
Start: 2024-07-13 | End: 2024-07-13

## 2024-07-13 RX ORDER — LIDOCAINE HCL 28 MG/G
1 GEL TOPICAL EVERY 24 HOURS
Refills: 0 | Status: DISCONTINUED | OUTPATIENT
Start: 2024-07-13 | End: 2024-07-13

## 2024-07-13 RX ORDER — METFORMIN HYDROCHLORIDE 850 MG/1
1 TABLET, FILM COATED ORAL
Refills: 0 | DISCHARGE

## 2024-07-13 RX ORDER — METOPROLOL TARTRATE 50 MG
1 TABLET ORAL
Refills: 0 | DISCHARGE

## 2024-07-13 RX ORDER — DEXTROSE MONOHYDRATE AND SODIUM CHLORIDE 5; .3 G/100ML; G/100ML
1000 INJECTION, SOLUTION INTRAVENOUS
Refills: 0 | Status: DISCONTINUED | OUTPATIENT
Start: 2024-07-13 | End: 2024-07-13

## 2024-07-13 RX ORDER — METOPROLOL TARTRATE 50 MG
25 TABLET ORAL EVERY 12 HOURS
Refills: 0 | Status: DISCONTINUED | OUTPATIENT
Start: 2024-07-13 | End: 2024-07-15

## 2024-07-13 RX ORDER — ALFUZOSIN HYDROCHLORIDE 10 MG/1
1 TABLET, EXTENDED RELEASE ORAL
Refills: 0 | DISCHARGE

## 2024-07-13 RX ORDER — HEPARIN SODIUM 50 [USP'U]/ML
5000 INJECTION, SOLUTION INTRAVENOUS EVERY 8 HOURS
Refills: 0 | Status: DISCONTINUED | OUTPATIENT
Start: 2024-07-13 | End: 2024-07-13

## 2024-07-13 RX ORDER — INSULIN LISPRO 100 [IU]/ML
INJECTION, SOLUTION SUBCUTANEOUS
Refills: 0 | Status: DISCONTINUED | OUTPATIENT
Start: 2024-07-13 | End: 2024-07-13

## 2024-07-13 RX ORDER — METHOCARBAMOL 500 MG
500 TABLET ORAL EVERY 8 HOURS
Refills: 0 | Status: DISCONTINUED | OUTPATIENT
Start: 2024-07-13 | End: 2024-07-15

## 2024-07-13 RX ORDER — SENNOSIDES 8.6 MG
2 TABLET ORAL AT BEDTIME
Refills: 0 | Status: DISCONTINUED | OUTPATIENT
Start: 2024-07-13 | End: 2024-07-15

## 2024-07-13 RX ORDER — VALSARTAN 320 MG/1
40 TABLET ORAL DAILY
Refills: 0 | Status: DISCONTINUED | OUTPATIENT
Start: 2024-07-13 | End: 2024-07-15

## 2024-07-13 RX ORDER — ENOXAPARIN SODIUM 100 MG/ML
30 INJECTION SUBCUTANEOUS EVERY 12 HOURS
Refills: 0 | Status: DISCONTINUED | OUTPATIENT
Start: 2024-07-13 | End: 2024-07-15

## 2024-07-13 RX ORDER — GABAPENTIN
100 POWDER (GRAM) MISCELLANEOUS EVERY 8 HOURS
Refills: 0 | Status: DISCONTINUED | OUTPATIENT
Start: 2024-07-13 | End: 2024-07-13

## 2024-07-13 RX ORDER — VALSARTAN 320 MG/1
1 TABLET ORAL
Refills: 0 | DISCHARGE

## 2024-07-13 RX ORDER — ACETAMINOPHEN 325 MG
975 TABLET ORAL EVERY 6 HOURS
Refills: 0 | Status: DISCONTINUED | OUTPATIENT
Start: 2024-07-13 | End: 2024-07-15

## 2024-07-13 RX ADMIN — Medication 975 MILLIGRAM(S): at 11:48

## 2024-07-13 RX ADMIN — ATORVASTATIN CALCIUM 20 MILLIGRAM(S): 20 TABLET, FILM COATED ORAL at 21:11

## 2024-07-13 RX ADMIN — Medication 100 MILLIGRAM(S): at 21:10

## 2024-07-13 RX ADMIN — VALSARTAN 40 MILLIGRAM(S): 320 TABLET ORAL at 05:13

## 2024-07-13 RX ADMIN — Medication 975 MILLIGRAM(S): at 17:03

## 2024-07-13 RX ADMIN — DEXTROSE MONOHYDRATE AND SODIUM CHLORIDE 110 MILLILITER(S): 5; .3 INJECTION, SOLUTION INTRAVENOUS at 05:13

## 2024-07-13 RX ADMIN — Medication 25 MILLIGRAM(S): at 17:03

## 2024-07-13 RX ADMIN — TAMSULOSIN HYDROCHLORIDE 0.8 MILLIGRAM(S): 0.4 CAPSULE ORAL at 21:11

## 2024-07-13 RX ADMIN — Medication 100 MILLIGRAM(S): at 05:14

## 2024-07-13 RX ADMIN — INSULIN LISPRO 2: 100 INJECTION, SOLUTION SUBCUTANEOUS at 11:46

## 2024-07-13 RX ADMIN — ENOXAPARIN SODIUM 30 MILLIGRAM(S): 100 INJECTION SUBCUTANEOUS at 17:03

## 2024-07-13 RX ADMIN — Medication 500 MILLIGRAM(S): at 21:10

## 2024-07-13 RX ADMIN — Medication 975 MILLIGRAM(S): at 23:04

## 2024-07-13 RX ADMIN — Medication 100 MILLIGRAM(S): at 14:30

## 2024-07-13 RX ADMIN — Medication 500 MILLIGRAM(S): at 14:30

## 2024-07-13 RX ADMIN — Medication 975 MILLIGRAM(S): at 12:30

## 2024-07-13 RX ADMIN — Medication 25 MILLIGRAM(S): at 05:14

## 2024-07-13 RX ADMIN — Medication 500 MILLIGRAM(S): at 05:14

## 2024-07-13 RX ADMIN — LIDOCAINE HCL 1 PATCH: 28 GEL TOPICAL at 11:48

## 2024-07-13 RX ADMIN — Medication 100 GRAM(S): at 04:06

## 2024-07-13 RX ADMIN — INSULIN LISPRO 2: 100 INJECTION, SOLUTION SUBCUTANEOUS at 21:29

## 2024-07-13 RX ADMIN — Medication 650 MILLIGRAM(S): at 05:13

## 2024-07-13 RX ADMIN — INSULIN LISPRO 2: 100 INJECTION, SOLUTION SUBCUTANEOUS at 17:02

## 2024-07-13 NOTE — PATIENT PROFILE ADULT - FALL HARM RISK - HARM RISK INTERVENTIONS

## 2024-07-13 NOTE — CONSULT NOTE ADULT - ASSESSMENT
IMPRESSION: Rehab of 78 y.o m  rehab  for  gd sp  fall  ribs  fx     PRECAUTIONS: [  ] Cardiac  [  ] Respiratory  [  ] Seizures [  ] Contact Isolation  [  ] Droplet Isolation  [ FALL ] Other    Weight Bearing Status:     RECOMMENDATION:    Out of Bed to Chair     DVT/Decubiti Prophylaxis    REHAB PLAN:     [   x] Bedside P/T 3-5 times a week   [   ]   Bedside O/T  2-3 times a week             [   ] No Rehab Therapy Indicated                   [   ]  Speech Therapy   Conditioning/ROM                                    ADL  Bed Mobility                                               Conditioning/ROM  Transfers                                                     Bed Mobility  Sitting /Standing Balance                         Transfers                                        Gait Training                                               Sitting/Standing Balance  Stair Training [   ]Applicable                    Home equipment Eval                                                                        Splinting  [   ] Only      GOALS:   ADL   [   x]   Independent                    Transfers  [ x  ] Independent                          Ambulation  [x   ] Independent     [    ] With device                            [  x ]  CG                                                         [  x ]  CG                                                                  [ x  ] CG                            [    ] Min A                                                   [   ] Min A                                                              [   ] Min  A          DISCHARGE PLAN:   [   ]  Good candidate for Intensive Rehabilitation/Hospital based-4A [facility]                                             Will tolerate 3hrs Intensive Rehab Daily                                       [   xx ]  Short Term Rehab in Skilled Nursing Facility pain med  and  cont currant care  d/w  ptn rehab  plan  ptn agree                                        [    ]  Home with Outpatient or VN services                                         [    ]  Possible Candidate for Intensive Hospital based Rehab

## 2024-07-13 NOTE — PROGRESS NOTE ADULT - ATTENDING COMMENTS
Patient complains of pain in right chest wall, 8/10.  CXR with moderate atelectasis.  In IS able to do 1L.  Had k 5.8 - treated.  Hb 14. Urine remains grossly clear.    ASSESSMENT:  79 y/o male, S/P Fall.  Closed Right 5-10th Rib Fractures.  Right Renal Hematoma.  Acute pain due to trauma.  At risk for hemodynamic instability.  Atelectasis.  CAD. DM.    PLAN:  - pain control - use Tylenol, Gabapentin, Robaxin; avoid opioids  - aggressive chest PT; incentive spirometer  - use O2 with NC as needed  - keep normotensive - on Valsartan 40 mg and Metoprolol 25 mg bid  - carb consistent diet  - follow serum electrolytes and UOP  - ID - universal precaution  - DVT prophylaxis  - PT needed Patient complains of pain in right chest wall, 8/10.  CXR with moderate atelectasis.  In IS able to do 1L.  Had k 5.8 - treated.  Hb 14. Urine remains grossly clear.    ASSESSMENT:  77 y/o male, S/P Fall.  Closed Right 5-10th Rib Fractures.  Right Renal Hematoma.  Acute pain due to trauma.  At risk for hemodynamic instability.  Atelectasis.  CAD. DM.  Hyperkalemia.    PLAN:  - pain control - use Tylenol, Gabapentin, Robaxin; avoid opioids  - aggressive chest PT; incentive spirometer  - use O2 with NC as needed  - keep normotensive - on Valsartan 40 mg and Metoprolol 25 mg bid  - carb consistent diet  - follow serum electrolytes and UOP  - ID - universal precaution  - DVT prophylaxis  - PT needed

## 2024-07-13 NOTE — PROGRESS NOTE ADULT - ASSESSMENT
78y M w/ PMHx of 78y Male s/p mechanical fall with R 5-10 rib fractures, and R renal hemorrhagic lesion.    PLAN:  - Manage pain control  - Encourage incentive spirometer use  - Monitor vitals  - Monitor electrolytes and replete as needed  - Monitor hemoglobin and replete if Hb < 7      Trauma [id number]

## 2024-07-13 NOTE — PHYSICAL THERAPY INITIAL EVALUATION ADULT - PERTINENT HX OF CURRENT PROBLEM, REHAB EVAL
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 +-HT, -LOC, -AC. Trauma assessment in ED: ABCs intact , GCS 15 , AAOx3. Pt states around 3pm he was in the shower when he slipped and fell on soap. Pt states he fell on his right side hitting the edge of the bathtub. Pt states he immediately felt " the wind come out of him" and states he had trouble breathing. Patient was able to get up right after the fall and call for help. Patient lives with his wife. Pt denies any headache, dizziness, nausea, vomiting, chest pain, or any other symptoms.

## 2024-07-13 NOTE — PROGRESS NOTE ADULT - SUBJECTIVE AND OBJECTIVE BOX
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 +-HT, -LOC, -AC. Trauma assessment in ED: ABCs intact , GCS 15 , AAOx3. Pt states around 3pm he was in the shower when he slipped and fell on soap. Pt states he fell on his right side hitting the edge of the bathtub. Pt states he immediately felt " the wind come out of him" and states he had trouble breathing. Patient was able to get up right after the fall and call for help. Patient lives with his wife. Pt denies any headache, dizziness, nausea, vomiting, chest pain, or any other symptoms.        Patient examined in ED, A+Ox3, saturating well on RA (98%), normotensive SBP 150s. Complaining of R sided chest pain, able to pull 1L on IS.     Imaging:  CHEST:  Acute, displaced fractures of the right fifth through 10th lateral ribs.   No pneumothorax.  Debris within the left main bronchus which can be seen with aspiration.  ABDOMEN/PELVIS:  Mixed density fluid at the right renal lower pole withfocal area of high   density fluid, likely reflecting blood products. Differential includes a   hemorrhagic lesion versus traumatic lesional rupture. No alyssa renal   laceration identified       24 Hour Events  -Admission under SICU service    [X] A ten-point review of systems was otherwise negative except as noted above.  [  ] Due to altered mental status/intubation, subjective information was not attained from the patient. History was obtained, to the extent possible, from review of the chart and collateral sources of information.    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 +-HT, -LOC, -AC. Trauma assessment in ED: ABCs intact , GCS 15 , AAOx3. Pt states around 3pm he was in the shower when he slipped and fell on soap. Pt states he fell on his right side hitting the edge of the bathtub. Pt states he immediately felt " the wind come out of him" and states he had trouble breathing. Patient was able to get up right after the fall and call for help. Patient lives with his wife. Pt denies any headache, dizziness, nausea, vomiting, chest pain, or any other symptoms.        Patient examined in ED, A+Ox3, saturating well on RA (98%), normotensive SBP 150s. Complaining of R sided chest pain, able to pull 1L on IS.     Imaging:  CHEST:  Acute, displaced fractures of the right fifth through 10th lateral ribs.   No pneumothorax.  Debris within the left main bronchus which can be seen with aspiration.  ABDOMEN/PELVIS:  Mixed density fluid at the right renal lower pole withfocal area of high   density fluid, likely reflecting blood products. Differential includes a   hemorrhagic lesion versus traumatic lesional rupture. No alyssa renal   laceration identified       24 Hour Events  -Admission under SICU service    [X] A ten-point review of systems was otherwise negative except as noted above.  [  ] Due to altered mental status/intubation, subjective information was not attained from the patient. History was obtained, to the extent possible, from review of the chart and collateral sources of information.    =========================================================================================================================================  Daily     Daily   Diet, DASH/TLC:   Sodium & Cholesterol Restricted (07-13-24 @ 04:25)    CURRENT MEDS:  Neurologic Medications  acetaminophen     Tablet .. 975 milliGRAM(s) Oral every 6 hours  gabapentin 100 milliGRAM(s) Oral every 8 hours  methocarbamol 500 milliGRAM(s) Oral every 8 hours    Respiratory Medications    Cardiovascular Medications  metoprolol tartrate 25 milliGRAM(s) Oral every 12 hours  valsartan 40 milliGRAM(s) Oral daily    Gastrointestinal Medications  senna 2 Tablet(s) Oral at bedtime    Genitourinary Medications  tamsulosin 0.8 milliGRAM(s) Oral at bedtime    Hematologic/Oncologic Medications  enoxaparin Injectable 30 milliGRAM(s) SubCutaneous every 12 hours    Antimicrobial/Immunologic Medications    Endocrine/Metabolic Medications  atorvastatin 20 milliGRAM(s) Oral at bedtime  insulin lispro (ADMELOG) corrective regimen sliding scale   SubCutaneous Before meals and at bedtime    Topical/Other Medications  chlorhexidine 2% Cloths 1 Application(s) Topical <User Schedule>  lidocaine   4% Patch 1 Patch Transdermal daily    ICU Vital Signs Last 24 Hrs  T(C): 36.9 (13 Jul 2024 08:18), Max: 37.6 (13 Jul 2024 00:08)  T(F): 98.4 (13 Jul 2024 08:18), Max: 99.6 (13 Jul 2024 00:08)  HR: 63 (13 Jul 2024 10:00) (60 - 88)  BP: 141/65 (13 Jul 2024 10:00) (137/73 - 184/87)  BP(mean): 94 (13 Jul 2024 10:00) (94 - 127)  ABP: --  ABP(mean): --  RR: 24 (13 Jul 2024 10:00) (16 - 27)  SpO2: 98% (13 Jul 2024 10:00) (96% - 100%)    O2 Parameters below as of 13 Jul 2024 10:00  Patient On (Oxygen Delivery Method): room air    I&O's Summary    12 Jul 2024 07:01  -  13 Jul 2024 07:00  --------------------------------------------------------  IN: 430 mL / OUT: 0 mL / NET: 430 mL    13 Jul 2024 07:01  -  13 Jul 2024 11:32  --------------------------------------------------------  IN: 220 mL / OUT: 1 mL / NET: 219 mL      I&O's Detail    12 Jul 2024 07:01  -  13 Jul 2024 07:00  --------------------------------------------------------  IN:    IV PiggyBack: 100 mL    Lactated Ringers: 330 mL  Total IN: 430 mL    OUT:  Total OUT: 0 mL    Total NET: 430 mL      13 Jul 2024 07:01  -  13 Jul 2024 11:32  --------------------------------------------------------  IN:    Lactated Ringers: 220 mL  Total IN: 220 mL    OUT:    Voided (mL): 1 mL  Total OUT: 1 mL    Total NET: 219 mL    PHYSICAL EXAM:     a&ox3  follows commands, RAZA  no acute distress  equal chest rise b/l, clear b/l breath sounds, 1.5L on IS, no chest wall tenderness or ecchymosis noted  abdomen soft  extremities soft  no right flank tenderness or ecchymosis noted

## 2024-07-13 NOTE — PROGRESS NOTE ADULT - SUBJECTIVE AND OBJECTIVE BOX
Patient tested positive for Covid on 6/16/22. Patient will need to wait 6 weeks to get injection.  (Message was sent to patient via [employer].) GENERAL SURGERY PROGRESS NOTE    Patient: CAROLINA VAUGHAN , 78y ([birth year])Male   MRN: [id number]  Location: [address]  Visit: 07-13-24 Inpatient  Date: 07-13-24 @ 16:50    Hospital Day #: 2  Post-Op Day #:    Procedure/Dx/Injuries: R rib fx 5-10, Hemorrhagic ruptured cyst    Events of past 24 hours: No acute events overnight. The patient was pulling 1 L on incentive spirometer.     PAST MEDICAL & SURGICAL HISTORY:  Diabetes      HTN (hypertension)      HLD (hyperlipidemia)      Acute myocardial infarction      Blindness of right eye      S/P CABG x 4          Vitals:   T(F): 98.1 (07-13-24 @ 12:00), Max: 99.6 (07-13-24 @ 00:08)  HR: 65 (07-13-24 @ 16:36)  BP: 149/74 (07-13-24 @ 16:36)  RR: 21 (07-13-24 @ 16:36)  SpO2: 97% (07-13-24 @ 16:36)      Diet, DASH/TLC:   Sodium & Cholesterol Restricted      Fluids:     I & O's:    07-12-24 @ 07:01  -  07-13-24 @ 07:00  --------------------------------------------------------  IN:    IV PiggyBack: 100 mL    Lactated Ringers: 330 mL  Total IN: 430 mL    OUT:  Total OUT: 0 mL    Total NET: 430 mL        Bowel Movement: : [] YES [] NO  Flatus: : [] YES [] NO    PHYSICAL EXAM:  General: NAD  Respiratory: normal respiratory effort on room air, pulling 1 L on incentive  Abdomen: Soft, non-distended, non-tender  Skin: Warm/dry, normal color, no jaundice    MEDICATIONS  (STANDING):  acetaminophen     Tablet .. 975 milliGRAM(s) Oral every 6 hours  atorvastatin 20 milliGRAM(s) Oral at bedtime  chlorhexidine 2% Cloths 1 Application(s) Topical <User Schedule>  enoxaparin Injectable 30 milliGRAM(s) SubCutaneous every 12 hours  gabapentin 100 milliGRAM(s) Oral every 8 hours  insulin lispro (ADMELOG) corrective regimen sliding scale   SubCutaneous Before meals and at bedtime  lidocaine   4% Patch 1 Patch Transdermal daily  methocarbamol 500 milliGRAM(s) Oral every 8 hours  metoprolol tartrate 25 milliGRAM(s) Oral every 12 hours  senna 2 Tablet(s) Oral at bedtime  tamsulosin 0.8 milliGRAM(s) Oral at bedtime  valsartan 40 milliGRAM(s) Oral daily    MEDICATIONS  (PRN):      DVT PROPHYLAXIS: enoxaparin Injectable 30 milliGRAM(s) SubCutaneous every 12 hours    GI PROPHYLAXIS:   ANTICOAGULATION:   ANTIBIOTICS:            LAB/STUDIES:  Labs:  CAPILLARY BLOOD GLUCOSE      POCT Blood Glucose.: 177 mg/dL (13 Jul 2024 16:36)  POCT Blood Glucose.: 167 mg/dL (13 Jul 2024 11:36)  POCT Blood Glucose.: 150 mg/dL (13 Jul 2024 07:35)                          14.4   10.68 )-----------( 240      ( 13 Jul 2024 02:05 )             43.1       Auto Neutrophil %: 83.4 % (07-13-24 @ 02:05)  Auto Immature Granulocyte %: 0.5 % (07-13-24 @ 02:05)  Auto Neutrophil %: 85.2 % (07-12-24 @ 19:52)  Auto Immature Granulocyte %: 0.6 % (07-12-24 @ 19:52)    07-13    137  |  104  |  17  ----------------------------<  191<H>  5.1<H>   |  20  |  1.1      Calcium: 9.2 mg/dL (07-13-24 @ 04:58)      LFTs:             7.1  | 0.5  | 29       ------------------[53      ( 12 Jul 2024 19:52 )  4.5  | x    | 26          Lipase:x      Amylase:x             Coags:            Urinalysis Basic - ( 13 Jul 2024 04:58 )    Color: x / Appearance: x / SG: x / pH: x  Gluc: 191 mg/dL / Ketone: x  / Bili: x / Urobili: x   Blood: x / Protein: x / Nitrite: x   Leuk Esterase: x / RBC: x / WBC x   Sq Epi: x / Non Sq Epi: x / Bacteria: x                IMAGING:    n/a     GENERAL SURGERY PROGRESS NOTE    Patient: CAROLINA VAUGHAN , 78y ([birth year])Male   MRN: [id number]  Location: [address]  Visit: 07-13-24 Inpatient  Date: 07-13-24 @ 16:50    Hospital Day #: 2  Post-Op Day #:    Procedure/Dx/Injuries: R rib fx 5-10, Hemorrhagic ruptured cyst    Events of past 24 hours: No acute events overnight. The patient was pulling 1 L on incentive spirometer.     PAST MEDICAL & SURGICAL HISTORY:  Diabetes  HTN (hypertension)  HLD (hyperlipidemia)  Acute myocardial infarction  Blindness of right eye  S/P CABG x 4    Vitals:   T(F): 98.1 (07-13-24 @ 12:00), Max: 99.6 (07-13-24 @ 00:08)  HR: 65 (07-13-24 @ 16:36)  BP: 149/74 (07-13-24 @ 16:36)  RR: 21 (07-13-24 @ 16:36)  SpO2: 97% (07-13-24 @ 16:36)      Diet, DASH/TLC:   Sodium & Cholesterol Restricted      Fluids:     I & O's:    07-12-24 @ 07:01  -  07-13-24 @ 07:00  --------------------------------------------------------  IN:    IV PiggyBack: 100 mL    Lactated Ringers: 330 mL  Total IN: 430 mL    OUT:  Total OUT: 0 mL    Total NET: 430 mL        Bowel Movement: : [] YES [] NO  Flatus: : [] YES [] NO    PHYSICAL EXAM:  General: NAD  Respiratory: normal respiratory effort on room air, pulling 1 L on incentive  Abdomen: Soft, non-distended, non-tender  Skin: Warm/dry, normal color, no jaundice    MEDICATIONS  (STANDING):  acetaminophen     Tablet .. 975 milliGRAM(s) Oral every 6 hours  atorvastatin 20 milliGRAM(s) Oral at bedtime  chlorhexidine 2% Cloths 1 Application(s) Topical <User Schedule>  enoxaparin Injectable 30 milliGRAM(s) SubCutaneous every 12 hours  gabapentin 100 milliGRAM(s) Oral every 8 hours  insulin lispro (ADMELOG) corrective regimen sliding scale   SubCutaneous Before meals and at bedtime  lidocaine   4% Patch 1 Patch Transdermal daily  methocarbamol 500 milliGRAM(s) Oral every 8 hours  metoprolol tartrate 25 milliGRAM(s) Oral every 12 hours  senna 2 Tablet(s) Oral at bedtime  tamsulosin 0.8 milliGRAM(s) Oral at bedtime  valsartan 40 milliGRAM(s) Oral daily    MEDICATIONS  (PRN):      DVT PROPHYLAXIS: enoxaparin Injectable 30 milliGRAM(s) SubCutaneous every 12 hours    GI PROPHYLAXIS:   ANTICOAGULATION:   ANTIBIOTICS:      LAB/STUDIES:  CAPILLARY BLOOD GLUCOSE  POCT Blood Glucose.: 177 mg/dL (13 Jul 2024 16:36)  POCT Blood Glucose.: 167 mg/dL (13 Jul 2024 11:36)  POCT Blood Glucose.: 150 mg/dL (13 Jul 2024 07:35)                          14.4   10.68 )-----------( 240      ( 13 Jul 2024 02:05 )             43.1       Auto Neutrophil %: 83.4 % (07-13-24 @ 02:05)  Auto Immature Granulocyte %: 0.5 % (07-13-24 @ 02:05)  Auto Neutrophil %: 85.2 % (07-12-24 @ 19:52)  Auto Immature Granulocyte %: 0.6 % (07-12-24 @ 19:52)    07-13    137  |  104  |  17  ----------------------------<  191<H>  5.1<H>   |  20  |  1.1  Calcium: 9.2 mg/dL (07-13-24 @ 04:58)      LFTs:           7.1  | 0.5  | 29       ------------------[53      ( 12 Jul 2024 19:52 )  4.5  | x    | 26          Urinalysis Basic - ( 13 Jul 2024 04:58 )    Color: x / Appearance: x / SG: x / pH: x  Gluc: 191 mg/dL / Ketone: x  / Bili: x / Urobili: x   Blood: x / Protein: x / Nitrite: x   Leuk Esterase: x / RBC: x / WBC x   Sq Epi: x / Non Sq Epi: x / Bacteria: x

## 2024-07-13 NOTE — CHART NOTE - NSCHARTNOTEFT_GEN_A_CORE
SICU Transfer Note    CAROLINA VAUGHAN  78y (1945)  [id number]      Transfer from: SICU  Transfer to: TRAUMA      SICU COURSE: Patient remained hemodynamically stable throughout SICU course. Patient had electrolytes replenished, is tolerating diet, and is out of bed. Patient seen by physiatry and is recommending short term rehab.    PAST MEDICAL & SURGICAL HISTORY:  Diabetes  HTN (hypertension)  HLD (hyperlipidemia)  Acute myocardial infarction  Blindness of right eye  S/P CABG x 4    Allergies  No Known Allergies    MEDICATIONS  (STANDING):  acetaminophen     Tablet .. 975 milliGRAM(s) Oral every 6 hours  atorvastatin 20 milliGRAM(s) Oral at bedtime  chlorhexidine 2% Cloths 1 Application(s) Topical <User Schedule>  enoxaparin Injectable 30 milliGRAM(s) SubCutaneous every 12 hours  gabapentin 100 milliGRAM(s) Oral every 8 hours  insulin lispro (ADMELOG) corrective regimen sliding scale   SubCutaneous Before meals and at bedtime  lidocaine   4% Patch 1 Patch Transdermal daily  methocarbamol 500 milliGRAM(s) Oral every 8 hours  metoprolol tartrate 25 milliGRAM(s) Oral every 12 hours  senna 2 Tablet(s) Oral at bedtime  tamsulosin 0.8 milliGRAM(s) Oral at bedtime  valsartan 40 milliGRAM(s) Oral daily    MEDICATIONS  (PRN):      Vital Signs Last 24 Hrs  T(C): 36.9 (13 Jul 2024 08:18), Max: 37.6 (13 Jul 2024 00:08)  T(F): 98.4 (13 Jul 2024 08:18), Max: 99.6 (13 Jul 2024 00:08)  HR: 63 (13 Jul 2024 10:00) (60 - 88)  BP: 141/65 (13 Jul 2024 10:00) (137/73 - 184/87)  BP(mean): 94 (13 Jul 2024 10:00) (94 - 127)  RR: 24 (13 Jul 2024 10:00) (16 - 27)  SpO2: 98% (13 Jul 2024 10:00) (96% - 100%)    Parameters below as of 13 Jul 2024 10:00  Patient On (Oxygen Delivery Method): room air      I&O's Summary    12 Jul 2024 07:01  -  13 Jul 2024 07:00  --------------------------------------------------------  IN: 430 mL / OUT: 0 mL / NET: 430 mL    13 Jul 2024 07:01  -  13 Jul 2024 11:44  --------------------------------------------------------  IN: 220 mL / OUT: 1 mL / NET: 219 mL    LABS:                        14.4   10.68 )-----------( 240      ( 13 Jul 2024 02:05 )             43.1       07-13    137  |  104  |  17  ----------------------------<  191<H>  5.1<H>   |  20  |  1.1    Ca    9.2      13 Jul 2024 04:58  Phos  3.3     07-13  Mg     1.8     07-13    TPro  7.1  /  Alb  4.5  /  TBili  0.5  /  DBili  x   /  AST  29  /  ALT  26  /  AlkPhos  53  07-12       Assessment & Plan:    78y Male s/p mechanical fall with R 5-10 rib fractures, and R renal hemorrhagic lesion.    NEURO:  #Acute pain    -Tylenol 975 q6 ATC    -Lidocaine patch    -Robaxin 500 q8    -Gabapentin 100 q8     RESP:   #Acute displaced R 5-10 rib fractures     -Pain control     -Encourage IS, currently pulling 1.5L on IS    -Respiratory monitoring  #Oxygenation    -Saturating well on RA  #Activity    -ambulate as tolerated    CARDS:   #Hx HTN    -Continue home valsartan 40 qD     -Continue home metoprolol  25 BID (home dose 50mg ER)    -Follows with cardiology Dr. Carrasco  #Hx CABG x4 2010    -Non-compliant with ASA    -By Dr. Castillo   #Hx HLD    -Continue atorvastatin 20mg qD  #Hx MI    GI/NUTR:   #Diet     -DASH diet  #GI Prophylaxis    -not indicated  #Bowel regimen    -senna    -last bowel movement unknown    /RENAL:   #R renal hemorrhagic lesion versus traumatic lesional rupture    -Hemoglobin monitoring q6     -UOP/ creatinine monitoring   #Hx BPH    -Continue alfuzosin 10mg (interchange with tamsulosin)    -Follows outpatient with Dr. Charles- urology   #Hyperkalemia on admission    -improved    Labs:          BUN/Cr- 16/1.2  -->,  17/1.1  -->          Electrolytes-(07-13 @ 04:58)Na 137 // K 5.1 // Mg 1.8 // Phos 3.3   #Hypomagnesemia    - 1g magnesium sulfate replenished       HEME/ONC:   #DVT prophylaxis  #DVT prophylaxis    -enoxaparin Injectable  , SCDs    Labs: Hb/Hct:  13.7/40.2  -->,  14.4/43.1  -->                      Plts:  199  -->,  240  -->                 PTT/INR:        Home Rx:   T&S Expires: pending    ID:  #leukocytosis   WBC- 11.48  --->>,  10.68  --->>  Temp trend- 24hrs T(F): 97.6 (07-13 @ 02:00), Max: 99.6 (07-13 @ 00:08)      ENDO:  #Hx DM    -Holding home Metformin    -FSG ACHS    -Glucose goal 140-180    -ISS     - A1c 8.2 (7/12)    MSK:     Activity - Ambulate as Tolerated:     Time/Priority:  Routine (07-13-24 @ 01:12) [Active]    LINES/DRAINS:  PIV    ADVANCED DIRECTIVES:  Full Code    HCP/Emergency Contact-    INDICATION FOR SICU: Respiratory monitoring in the setting of R rib rib fractures 5-10, R renal hemorrhagic lesion     DISPO:  4C Case discussed with attending Dr. Boyce  Will benefit from short term rehab per physiatry    Follow Up:  - 2000 labs  - dispo     Signed out to: Dr. Heller  Date: 7/13/24  Time: 11:49am SICU Transfer Note    CAROLINA VAUGHAN  78y (1945)  [id number]      Transfer from: SICU  Transfer to: TRAUMA      SICU COURSE: Patient remained hemodynamically stable throughout SICU course. Patient had electrolytes replenished, is tolerating diet, and is out of bed. Patient seen by physiatry and is recommending short term rehab.    PAST MEDICAL & SURGICAL HISTORY:  Diabetes  HTN (hypertension)  HLD (hyperlipidemia)  Acute myocardial infarction  Blindness of right eye  S/P CABG x 4    Allergies  No Known Allergies    MEDICATIONS  (STANDING):  acetaminophen     Tablet .. 975 milliGRAM(s) Oral every 6 hours  atorvastatin 20 milliGRAM(s) Oral at bedtime  chlorhexidine 2% Cloths 1 Application(s) Topical <User Schedule>  enoxaparin Injectable 30 milliGRAM(s) SubCutaneous every 12 hours  gabapentin 100 milliGRAM(s) Oral every 8 hours  insulin lispro (ADMELOG) corrective regimen sliding scale   SubCutaneous Before meals and at bedtime  lidocaine   4% Patch 1 Patch Transdermal daily  methocarbamol 500 milliGRAM(s) Oral every 8 hours  metoprolol tartrate 25 milliGRAM(s) Oral every 12 hours  senna 2 Tablet(s) Oral at bedtime  tamsulosin 0.8 milliGRAM(s) Oral at bedtime  valsartan 40 milliGRAM(s) Oral daily    MEDICATIONS  (PRN):      Vital Signs Last 24 Hrs  T(C): 36.9 (13 Jul 2024 08:18), Max: 37.6 (13 Jul 2024 00:08)  T(F): 98.4 (13 Jul 2024 08:18), Max: 99.6 (13 Jul 2024 00:08)  HR: 63 (13 Jul 2024 10:00) (60 - 88)  BP: 141/65 (13 Jul 2024 10:00) (137/73 - 184/87)  BP(mean): 94 (13 Jul 2024 10:00) (94 - 127)  RR: 24 (13 Jul 2024 10:00) (16 - 27)  SpO2: 98% (13 Jul 2024 10:00) (96% - 100%)    Parameters below as of 13 Jul 2024 10:00  Patient On (Oxygen Delivery Method): room air      I&O's Summary    12 Jul 2024 07:01  -  13 Jul 2024 07:00  --------------------------------------------------------  IN: 430 mL / OUT: 0 mL / NET: 430 mL    13 Jul 2024 07:01  -  13 Jul 2024 11:44  --------------------------------------------------------  IN: 220 mL / OUT: 1 mL / NET: 219 mL    LABS:                        14.4   10.68 )-----------( 240      ( 13 Jul 2024 02:05 )             43.1       07-13    137  |  104  |  17  ----------------------------<  191<H>  5.1<H>   |  20  |  1.1    Ca    9.2      13 Jul 2024 04:58  Phos  3.3     07-13  Mg     1.8     07-13    TPro  7.1  /  Alb  4.5  /  TBili  0.5  /  DBili  x   /  AST  29  /  ALT  26  /  AlkPhos  53  07-12       Assessment & Plan:    78y Male s/p mechanical fall with R 5-10 rib fractures, and R renal hemorrhagic lesion.    NEURO:  #Acute pain    -Tylenol 975 q6 ATC    -Lidocaine patch    -Robaxin 500 q8    -Gabapentin 100 q8     RESP:   #Acute displaced R 5-10 rib fractures     -Pain control     -Encourage IS, currently pulling 1.5L on IS    -Respiratory monitoring  #Oxygenation    -Saturating well on RA  #Activity    -ambulate as tolerated    CARDS:   #Hx HTN    -Continue home valsartan 40 qD     -Continue home metoprolol  25 BID (home dose 50mg ER)    -Follows with cardiology Dr. Carrasco  #Hx CABG x4 2010    -Non-compliant with ASA    -By Dr. Castillo   #Hx HLD    -Continue atorvastatin 20mg qD  #Hx MI    GI/NUTR:   #Diet     -DASH diet  #GI Prophylaxis    -not indicated  #Bowel regimen    -senna    -last bowel movement unknown    /RENAL:   #R renal hemorrhagic lesion versus traumatic lesional rupture    -Hemoglobin monitoring q6     -UOP/ creatinine monitoring   #Hx BPH    -Continue alfuzosin 10mg (interchange with tamsulosin)    -Follows outpatient with Dr. Charles- urology   #Hyperkalemia on admission    -improved    Labs:          BUN/Cr- 16/1.2  -->,  17/1.1  -->          Electrolytes-(07-13 @ 04:58)Na 137 // K 5.1 // Mg 1.8 // Phos 3.3   #Hypomagnesemia    - 1g magnesium sulfate replenished       HEME/ONC:   #DVT prophylaxis  #DVT prophylaxis    -enoxaparin Injectable  , SCDs    Labs: Hb/Hct:  13.7/40.2  -->,  14.4/43.1  -->                      Plts:  199  -->,  240  -->                 PTT/INR:        Home Rx:   T&S Expires: pending    ID:  #leukocytosis   WBC- 11.48  --->>,  10.68  --->>  Temp trend- 24hrs T(F): 97.6 (07-13 @ 02:00), Max: 99.6 (07-13 @ 00:08)      ENDO:  #Hx DM    -Holding home Metformin    -FSG ACHS    -Glucose goal 140-180    -ISS     - A1c 8.2 (7/12)    MSK:     Activity - Ambulate as Tolerated:     Time/Priority:  Routine (07-13-24 @ 01:12) [Active]    LINES/DRAINS:  PIV    ADVANCED DIRECTIVES:  Full Code    HCP/Emergency Contact-    INDICATION FOR SICU: Respiratory monitoring in the setting of R rib rib fractures 5-10, R renal hemorrhagic lesion     DISPO:  4C Case discussed with attending Dr. Boyce  Will benefit from short term rehab per physiatry    Follow Up:  - 2000 labs  - dispo for short term rehab    Signed out to: Dr. Heller  Date: 7/13/24  Time: 11:49am

## 2024-07-13 NOTE — PROGRESS NOTE ADULT - ASSESSMENT
Assessment & Plan    78y Male  s/p mechanical fall with R 5-10 rib fractures, and R renal hemorrhagic lesion.    NEURO:  #Acute pain    -Tylenol 650 q6 ATC    -Lidocaine patch    -Robaxin 500 q8    -Gabapentin 100 q8    -Dilaudid 0.25 q4 prn for moderate pain    -Dilaudid 0.5 q4 prn for severe pain     RESP:   #Acute displaced R 5-10 rib fractures     -Pain control     -Encourage IS, currently pulling 1L on IS    -Respiratory monitoring  #Oxygenation    -Saturating well on RA  #Activity    -increase as tolerated    CARDS:   #Hx HTN    -Continue home valsartan 40 qD     -Continue home metoprolol  25 BID (home 50mg ER)    -Follows with cardiology Dr. Carrasco  #Hx CABG x4 2010    -Non-compliant with ASA    -By Dr. Castillo   #Hx HLD    -Continue atorvastatin 20mg qD  #Hx MI  Rx-metoprolol tartrate 25 BID  valsartan 40 daily    GI/NUTR:   #Diet NPO except meds  #GI Prophylaxis    -not indicated  #Bowel regimen    -senna    -last bowel movement unknown    /RENAL:   #R renal hemorrhagic lesion versus traumatic lesional rupture    -Hemoglobin monitoring q6     -UOP/ creatinine monitoring   #Hx BPH    -Continue alfuzosin 10mg (interchange with tamsulosin)    -Follows outpatient with Dr. Charles- urology   #Hyperkalemia on admission    -Insulin/dextrose, repeat BMP in AM    Labs:   BUN/Cr- 17/1.2  -->  Electrolytes-(07-12 @ 19:52)Na 138 // K 5.2 // Mg -- // Phos --   #maintain euvolemia    -LR @120             HEME/ONC:   #DVT prophylaxis    -Holding until stable Hgb      Labs: Hb/Hct:  13.7/40.2  (07-12 @ 19:52)  -->                      Plts:  199  -->                 PTT/INR:        ID:  #leukocytosis   WBC- 11.48  --->>  Temp trend- 24hrs T(F): 99.6 (07-13 @ 00:08), Max: 99.6 (07-13 @ 00:08)  Current antibiotics- n/a      ENDO:  #Hx DM    -Holding home Metformin    -FSG q6 while NPO    -Glucose goal 140-180    -ISS    MSK:     Activity - Ambulate as Tolerated:     Time/Priority:  Routine (07-13-24 @ 01:12) [Active]        LINES/DRAINS:  PIV    ADVANCED DIRECTIVES:  Full Code    HCP/Emergency Contact-    INDICATION FOR SICU: Respiratory monitoring in the setting of R rib rib fractures 5-10, R renal hemorrhagic lesion     DISPO:   SICU. Case discussed with attending Dr. Boyce Assessment & Plan    78y Male  s/p mechanical fall with R 5-10 rib fractures, and R renal hemorrhagic lesion.    NEURO:  #Acute pain    -Tylenol 650 q6 ATC    -Lidocaine patch    -Robaxin 500 q8    -Gabapentin 100 q8    -Dilaudid 0.25 q4 prn for moderate pain    -Dilaudid 0.5 q4 prn for severe pain     RESP:   #Acute displaced R 5-10 rib fractures     -Pain control     -Encourage IS, currently pulling 1L on IS    -Respiratory monitoring  #Oxygenation    -Saturating well on RA  #Activity    -increase as tolerated    CARDS:   #Hx HTN    -Continue home valsartan 40 qD     -Continue home metoprolol  25 BID (home 50mg ER)    -Follows with cardiology Dr. Carrasco  #Hx CABG x4 2010    -Non-compliant with ASA    -By Dr. Castillo   #Hx HLD    -Continue atorvastatin 20mg qD  #Hx MI  Rx-metoprolol tartrate 25 BID  valsartan 40 daily    GI/NUTR:   #Diet     -DASH diet  #GI Prophylaxis    -not indicated  #Bowel regimen    -senna    -last bowel movement unknown    /RENAL:   #R renal hemorrhagic lesion versus traumatic lesional rupture    -Hemoglobin monitoring q6     -UOP/ creatinine monitoring   #Hx BPH    -Continue alfuzosin 10mg (interchange with tamsulosin)    -Follows outpatient with Dr. Charles- urology   #Hyperkalemia on admission    -Insulin/dextrose, repeat BMP in AM    Labs:          BUN/Cr- 17/1.2  -->,  16/1.2  -->          Electrolytes-(07-13 @ 02:05)Na 135 // K 5.4 // Mg 1.9 // Phos 3.2   #maintain euvolemia    -LR @110             HEME/ONC:   #DVT prophylaxis    -Holding until stable Hgb      Labs: Hb/Hct:  13.7/40.2  -->,  14.4/43.1  -->                      Plts:  199  -->,  240  -->                 PTT/INR:      T&S Expires:     ID:  #leukocytosis   WBC- 11.48  --->>,  10.68  --->>  Temp trend- 24hrs T(F): 97.6 (07-13 @ 02:00), Max: 99.6 (07-13 @ 00:08)  Current antibiotics- n/a      ENDO:  #Hx DM    -Holding home Metformin    -FSG ACHS    -Glucose goal 140-180    -ISS     MSK:     Activity - Ambulate as Tolerated:     Time/Priority:  Routine (07-13-24 @ 01:12) [Active]        LINES/DRAINS:  PIV    ADVANCED DIRECTIVES:  Full Code    HCP/Emergency Contact-    INDICATION FOR SICU: Respiratory monitoring in the setting of R rib rib fractures 5-10, R renal hemorrhagic lesion     DISPO:   SICU. Case discussed with attending Dr. Boyce Assessment & Plan  78y Male s/p mechanical fall with R 5-10 rib fractures, and R renal hemorrhagic lesion.    NEURO:  #Acute pain    -Tylenol 975 q6 ATC    -Lidocaine patch    -Robaxin 500 q8    -Gabapentin 100 q8     RESP:   #Acute displaced R 5-10 rib fractures     -Pain control     -Encourage IS, currently pulling 1.5L on IS    -Respiratory monitoring  #Oxygenation    -Saturating well on RA  #Activity    -ambulate as tolerated    CARDS:   #Hx HTN    -Continue home valsartan 40 qD     -Continue home metoprolol  25 BID (home dose 50mg ER)    -Follows with cardiology Dr. Carrasco  #Hx CABG x4 2010    -Non-compliant with ASA    -By Dr. Castillo   #Hx HLD    -Continue atorvastatin 20mg qD  #Hx MI    GI/NUTR:   #Diet     -DASH diet  #GI Prophylaxis    -not indicated  #Bowel regimen    -senna    -last bowel movement unknown    /RENAL:   #R renal hemorrhagic lesion versus traumatic lesional rupture    -Hemoglobin monitoring q6     -UOP/ creatinine monitoring   #Hx BPH    -Continue alfuzosin 10mg (interchange with tamsulosin)    -Follows outpatient with Dr. Charles- urology   #Hyperkalemia on admission    -improved    Labs:          BUN/Cr- 16/1.2  -->,  17/1.1  -->          Electrolytes-(07-13 @ 04:58)Na 137 // K 5.1 // Mg 1.8 // Phos 3.3   #Hypomagnesemia    - 1g magnesium sulfate replenished       HEME/ONC:   #DVT prophylaxis  #DVT prophylaxis    -enoxaparin Injectable  , SCDs    Labs: Hb/Hct:  13.7/40.2  -->,  14.4/43.1  -->                      Plts:  199  -->,  240  -->                 PTT/INR:        Home Rx:   T&S Expires: pending    ID:  #leukocytosis   WBC- 11.48  --->>,  10.68  --->>  Temp trend- 24hrs T(F): 97.6 (07-13 @ 02:00), Max: 99.6 (07-13 @ 00:08)      ENDO:  #Hx DM    -Holding home Metformin    -FSG ACHS    -Glucose goal 140-180    -ISS     - A1c 8.2 (7/12)    MSK:     Activity - Ambulate as Tolerated:     Time/Priority:  Routine (07-13-24 @ 01:12) [Active]    LINES/DRAINS:  PIV    ADVANCED DIRECTIVES:  Full Code    HCP/Emergency Contact-    INDICATION FOR SICU: Respiratory monitoring in the setting of R rib rib fractures 5-10, R renal hemorrhagic lesion     DISPO:  4C Case discussed with attending Dr. Boyce

## 2024-07-13 NOTE — PROGRESS NOTE ADULT - ATTENDING COMMENTS
Trauma/ACS Attending  Note Attestation    Patient is examined and evaluated at the bedside with the residents/PAs. Treatment plan discussed with the team, nurses, and consulting physicians and consulting teams. Medications, radiological studies and all other relevant studies reviewed.     CAROLINA VAUGHAN Patient is a 78y old  Male who presents with a chief complaint of S/P Fall - Multiple Rib Fractures and right renal cyst rapture due to trauma      Vital Signs Last 24 Hrs  T(C): 36.6 (13 Jul 2024 18:46), Max: 37.6 (13 Jul 2024 00:08)  T(F): 97.8 (13 Jul 2024 18:46), Max: 99.6 (13 Jul 2024 00:08)  HR: 69 (13 Jul 2024 18:46) (60 - 88)  BP: 143/78 (13 Jul 2024 18:46) (112/64 - 184/87)  BP(mean): 100 (13 Jul 2024 17:00) (83 - 127)  RR: 18 (13 Jul 2024 18:46) (16 - 27)  SpO2: 98% (13 Jul 2024 18:46) (96% - 100%)    Parameters below as of 13 Jul 2024 17:00  Patient On (Oxygen Delivery Method): room air                            14.4   10.68 )-----------( 240      ( 13 Jul 2024 02:05 )             43.1     07-13    138  |  105  |  19  ----------------------------<  213<H>  4.6   |  22  |  1.2    Ca    9.0      13 Jul 2024 20:00  Phos  3.2     07-13  Mg     1.9     07-13    TPro  7.1  /  Alb  4.5  /  TBili  0.5  /  DBili  x   /  AST  29  /  ALT  26  /  AlkPhos  53  07-12    Diagnosis: Fall                  Multiple ribs fractures                  Right renal cyst rapture    Plan:	  - supportive care  - pain management  - incentive spirometer 1000ml  - follow up Hg/Hct  - follow up UA  - DVT prophylaxis       [x ] SCDs [ x] Lovenox SQ [ ] Heparins SQ  [ ] None  - GI prophylaxis  - follow up consults  - repeat studies as needed  - out of bed to chair  - PT evaluation and follow up  - replace electrolytes  - case management evaluation     Bianca Ravi MD, FACS  Trauma/ACS/Surgical Critical Care Attending

## 2024-07-13 NOTE — PHYSICAL THERAPY INITIAL EVALUATION ADULT - GENERAL OBSERVATIONS, REHAB EVAL
2:15-2:45 pt encountered in bedside chair in NAD,  +tele.  he formed transfers and ambulated with assistance,limited by right chest pain and unsteady gait.  Pt would benefit from continued PT to restore prior level of mobility and safety.

## 2024-07-13 NOTE — CONSULT NOTE ADULT - SUBJECTIVE AND OBJECTIVE BOX
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 +-HT, -LOC, -AC. Trauma assessment in ED: ABCs intact , GCS 15 , AAOx3. Pt states around 3pm he was in the shower when he slipped and fell on soap. Pt states he fell on his right side hitting the edge of the bathtub. Pt states he immediately felt " the wind come out of him" and states he had trouble breathing. Patient was able to get up right after the fall and call for help. Patient lives with his wife. Pt denies any headache, dizziness, nausea, vomiting, chest pain, or any other symptoms.        Patient examined in ED, A+Ox3, saturating well on RA (98%), normotensive SBP 150s. Complaining of R sided chest pain, able to pull 1L on IS.     Imaging:  CHEST:  Acute, displaced fractures of the right fifth through 10th lateral ribs.   No pneumothorax.  Debris within the left main bronchus which can be seen with aspiration.  ABDOMEN/PELVIS:  Mixed density fluid at the right renal lower pole withfocal area of high   density fluid, likely reflecting blood products. Differential includes a   hemorrhagic lesion versus traumatic lesional rupture. No alyssa renal   laceration identified       24 Hour Events  -Admission under SICU service    [X] A ten-point review of systems was otherwise negative except as noted above.  [  ] Due to altered mental status/intubation, subjective information was not attained from the patient. History was obtained, to the extent possible, from review of the chart and collateral sources of information.    =========================================================================================================================================    PMH  PAST MEDICAL & SURGICAL HISTORY:  Diabetes  HTN (hypertension)  HLD (hyperlipidemia)  Acute myocardial infarction  S/P CABG x 4        Home Meds:  Home Medications:  atorvastatin 20 mg oral tablet: 1 tab(s) orally once a day (at bedtime) (12 Jul 2024 23:43)  metFORMIN 1000 mg oral tablet: 1 tab(s) orally 2 times a day (12 Jul 2024 23:46)  metoprolol succinate 50 mg oral capsule, extended release: 1 cap(s) orally once a day (12 Jul 2024 23:46)  valsartan 40 mg oral tablet: 1 tab(s) orally once a day (12 Jul 2024 23:47)     Allergies  Allergies    No Known Allergies    Intolerances       Current Medications:  atorvastatin 20 milliGRAM(s) Oral at bedtime  metoprolol succinate ER 50 milliGRAM(s) Oral daily  tamsulosin 0.8 milliGRAM(s) Oral at bedtime  valsartan 40 milliGRAM(s) Oral daily      VITAL SIGNS, INS/OUTS (Last 24Hours)  ICU Vital Signs Last 24 Hrs  T(C): 37.6 (13 Jul 2024 00:08), Max: 37.6 (13 Jul 2024 00:08)  T(F): 99.6 (13 Jul 2024 00:08), Max: 99.6 (13 Jul 2024 00:08)  HR: 81 (13 Jul 2024 00:08) (79 - 81)  BP: 156/74 (13 Jul 2024 00:08) (156/74 - 181/104)  BP(mean): --  ABP: --  ABP(mean): --  RR: 19 (13 Jul 2024 00:08) (16 - 19)  SpO2: 96% (13 Jul 2024 00:08) (96% - 98%)    O2 Parameters below as of 13 Jul 2024 00:08  Patient On (Oxygen Delivery Method): room air          I&O's Summary      Physical Exam:   ----------------------------------------------------------------------------------------------------------  GCS:   15  Exam: A&Ox3, no focal deficits  RESPIRATORY: Normal expansion/effort, on RA, pain on deep inspiration   CARDIOVASCULAR: Non-tachycardic. No peripheral edema  GASTROINTESTINAL: Abdomen soft, non-tender, non-distended  MUSCULOSKELETAL: Extremities warm, pink, well-perfused.  DERM: No skin breakdown   : Voiding    Tubes/Lines/Drains   ----------------------------------------------------------------------------------------------------------  [x] Peripheral IV  [ ] Urinary Catheter Herrera                                               [ ] Central Venous Line                   [ ] Arterial Line		      
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 +-HT, -LOC, -AC. Trauma assessment in ED: ABCs intact , GCS 15 , AAOx3. Pt states around 3pm he was in the shower when he slipped and fell on soap. Pt states he fell on his right side hitting the edge of the bathtub. Pt states he immediately felt " the wind come out of him" and states he had trouble breathing. Patient was able to get up right after the fall and call for help. Patient lives with his wife. Pt denies any headache, dizziness, nausea, vomiting, chest pain, or any other symptoms.    Primary Survey:    A - airway intact  B - bilateral breath sounds and good chest rise  C - palpable pulses in all extremities  D - GCS 15 on arrival, RAZA  Exposure obtained    Vital Signs Last 24 Hrs  T(C): 36.7 (12 Jul 2024 18:28), Max: 36.7 (12 Jul 2024 18:28)  T(F): 98 (12 Jul 2024 18:28), Max: 98 (12 Jul 2024 18:28)  HR: 79 (12 Jul 2024 18:28) (79 - 79)  BP: 181/104 (12 Jul 2024 18:28) (181/104 - 181/104)  BP(mean): --  RR: 16 (12 Jul 2024 18:28) (16 - 16)  SpO2: 98% (12 Jul 2024 18:28) (98% - 98%)  Parameters below as of 12 Jul 2024 18:28  Patient On (Oxygen Delivery Method): room air  ptn seen and  exam  oob  to chair  aox3  no new   c/o doing  well ptn  labs, imaging  and medical  notes are appreciated  and  reviewed    PTN  REFERRED TO ACUTE  REHAB  FOR  EVAL AND  TX   PAST MEDICAL & SURGICAL HISTORY:  Diabetes      HTN (hypertension)      HLD (hyperlipidemia)      Acute myocardial infarction      Blindness of right eye      S/P CABG x 4          Hospital Course:    TODAY'S SUBJECTIVE & REVIEW OF SYMPTOMS:     Constitutional WNL   Cardio WNL   Resp WNL   GI WNL  Heme WNL  Endo WNL  Skin WNL  MSK WNL  Neuro WNL  Cognitive WNL  Psych WNL      MEDICATIONS  (STANDING):  acetaminophen     Tablet .. 975 milliGRAM(s) Oral every 6 hours  atorvastatin 20 milliGRAM(s) Oral at bedtime  chlorhexidine 2% Cloths 1 Application(s) Topical <User Schedule>  enoxaparin Injectable 30 milliGRAM(s) SubCutaneous every 12 hours  gabapentin 100 milliGRAM(s) Oral every 8 hours  insulin lispro (ADMELOG) corrective regimen sliding scale   SubCutaneous Before meals and at bedtime  lidocaine   4% Patch 1 Patch Transdermal daily  methocarbamol 500 milliGRAM(s) Oral every 8 hours  metoprolol tartrate 25 milliGRAM(s) Oral every 12 hours  senna 2 Tablet(s) Oral at bedtime  tamsulosin 0.8 milliGRAM(s) Oral at bedtime  valsartan 40 milliGRAM(s) Oral daily    MEDICATIONS  (PRN):      FAMILY HISTORY:      Allergies    No Known Allergies    Intolerances        SOCIAL HISTORY:    [  ] Etoh  [  ] Smoking  [  ] Substance abuse     Home Environment:  [  ] Home Alone  [ x ] Lives with Family wife  [  ] Home Health Aid    Dwelling:  [  ] Apartment  [ x ] Private House  [  ] Adult Home  [  ] Skilled Nursing Facility      [  ] Short Term  [  ] Long Term  [ x ] Stairs       Elevator [  ]    FUNCTIONAL STATUS PTA: (Check all that apply)  Ambulation: [ x  ]Independent    [  ] Dependent     [  ] Non-Ambulatory  Assistive Device: [  ] SA Cane  [  ]  Q Cane  [  ] Walker  [  ]  Wheelchair  ADL : [x ] Independent  [  ]  Dependent       Vital Signs Last 24 Hrs  T(C): 36.9 (13 Jul 2024 08:18), Max: 37.6 (13 Jul 2024 00:08)  T(F): 98.4 (13 Jul 2024 08:18), Max: 99.6 (13 Jul 2024 00:08)  HR: 62 (13 Jul 2024 09:00) (60 - 88)  BP: 146/72 (13 Jul 2024 09:00) (137/73 - 184/87)  BP(mean): 101 (13 Jul 2024 09:00) (99 - 127)  RR: 25 (13 Jul 2024 09:00) (16 - 27)  SpO2: 97% (13 Jul 2024 09:00) (96% - 100%)    Parameters below as of 13 Jul 2024 09:00  Patient On (Oxygen Delivery Method): room air          PHYSICAL EXAM: Alert & Oriented X3  GENERAL: NAD, well-groomed, well-developed  HEAD:  Atraumatic, Normocephalic  EYES: EOMI, PERRLA, conjunctiva and sclera clear  NECK: Supple, No JVD, Normal thyroid  CHEST/LUNG: Clear to percussion bilaterally; No rales, rhonchi, wheezing, or rubs  HEART: Regular rate and rhythm; No murmurs, rubs, or gallops  ABDOMEN: Soft, Nontender, Nondistended; Bowel sounds present  EXTREMITIES:  2+ Peripheral Pulses, No clubbing, cyanosis, or edema    NERVOUS SYSTEM:  Cranial Nerves 2-12 intact [ x ] Abnormal  [  ]  ROM: WFL all extremities [ x ]  Abnormal [  ]  Motor Strength: WFL all extremities  [x  ]  Abnormal [  ]  Sensation: intact to light touch [ x ] Abnormal [  ]  Reflexes: Symmetric [x  ]  Abnormal [  ]    FUNCTIONAL STATUS:  Bed Mobility: Independent [  ]  Supervision [  ]  Needs Assistance [ x ]  N/A [  ]  Transfers: Independent [  ]  Supervision [  ]  Needs Assistance [  x]  N/A [  ]   Ambulation: Independent [  ]  Supervision [  ]  Needs Assistance [x  ]  N/A [  ]  ADL: Independent [  ] Requires Assistance [  ] N/A [  x]  SEE PT/OT IE NOTES    LABS:                        14.4   10.68 )-----------( 240      ( 13 Jul 2024 02:05 )             43.1     07-13    137  |  104  |  17  ----------------------------<  191<H>  5.1<H>   |  20  |  1.1    Ca    9.2      13 Jul 2024 04:58  Phos  3.3     07-13  Mg     1.8     07-13    TPro  7.1  /  Alb  4.5  /  TBili  0.5  /  DBili  x   /  AST  29  /  ALT  26  /  AlkPhos  53  07-12      Urinalysis Basic - ( 13 Jul 2024 04:58 )    Color: x / Appearance: x / SG: x / pH: x  Gluc: 191 mg/dL / Ketone: x  / Bili: x / Urobili: x   Blood: x / Protein: x / Nitrite: x   Leuk Esterase: x / RBC: x / WBC x   Sq Epi: x / Non Sq Epi: x / Bacteria: x        RADIOLOGY & ADDITIONAL STUDIES:< from: Xray Chest 1 View AP/PA (07.13.24 @ 07:09) >    Impression:    Bibasilar focal atelectasis. Right rib fractures, no pneumothorax.      < end of copied text >      Assesment:

## 2024-07-14 LAB
ANION GAP SERPL CALC-SCNC: 18 MMOL/L — HIGH (ref 7–14)
BASOPHILS # BLD AUTO: 0.06 K/UL — SIGNIFICANT CHANGE UP (ref 0–0.2)
BASOPHILS NFR BLD AUTO: 0.5 % — SIGNIFICANT CHANGE UP (ref 0–1)
BUN SERPL-MCNC: 18 MG/DL — SIGNIFICANT CHANGE UP (ref 10–20)
CALCIUM SERPL-MCNC: 9.7 MG/DL — SIGNIFICANT CHANGE UP (ref 8.4–10.4)
CHLORIDE SERPL-SCNC: 100 MMOL/L — SIGNIFICANT CHANGE UP (ref 98–110)
CO2 SERPL-SCNC: 18 MMOL/L — SIGNIFICANT CHANGE UP (ref 17–32)
CREAT SERPL-MCNC: 1.2 MG/DL — SIGNIFICANT CHANGE UP (ref 0.7–1.5)
EGFR: 62 ML/MIN/1.73M2 — SIGNIFICANT CHANGE UP
EOSINOPHIL # BLD AUTO: 0.12 K/UL — SIGNIFICANT CHANGE UP (ref 0–0.7)
EOSINOPHIL NFR BLD AUTO: 1 % — SIGNIFICANT CHANGE UP (ref 0–8)
GLUCOSE BLDC GLUCOMTR-MCNC: 142 MG/DL — HIGH (ref 70–99)
GLUCOSE BLDC GLUCOMTR-MCNC: 153 MG/DL — HIGH (ref 70–99)
GLUCOSE BLDC GLUCOMTR-MCNC: 194 MG/DL — HIGH (ref 70–99)
GLUCOSE BLDC GLUCOMTR-MCNC: 217 MG/DL — HIGH (ref 70–99)
GLUCOSE SERPL-MCNC: 166 MG/DL — HIGH (ref 70–99)
HCT VFR BLD CALC: 43.8 % — SIGNIFICANT CHANGE UP (ref 42–52)
HGB BLD-MCNC: 14.7 G/DL — SIGNIFICANT CHANGE UP (ref 14–18)
IMM GRANULOCYTES NFR BLD AUTO: 0.3 % — SIGNIFICANT CHANGE UP (ref 0.1–0.3)
LYMPHOCYTES # BLD AUTO: 2.65 K/UL — SIGNIFICANT CHANGE UP (ref 1.2–3.4)
LYMPHOCYTES # BLD AUTO: 22.5 % — SIGNIFICANT CHANGE UP (ref 20.5–51.1)
MAGNESIUM SERPL-MCNC: 2 MG/DL — SIGNIFICANT CHANGE UP (ref 1.8–2.4)
MCHC RBC-ENTMCNC: 30.9 PG — SIGNIFICANT CHANGE UP (ref 27–31)
MCHC RBC-ENTMCNC: 33.6 G/DL — SIGNIFICANT CHANGE UP (ref 32–37)
MCV RBC AUTO: 92.2 FL — SIGNIFICANT CHANGE UP (ref 80–94)
MONOCYTES # BLD AUTO: 1.06 K/UL — HIGH (ref 0.1–0.6)
MONOCYTES NFR BLD AUTO: 9 % — SIGNIFICANT CHANGE UP (ref 1.7–9.3)
NEUTROPHILS # BLD AUTO: 7.85 K/UL — HIGH (ref 1.4–6.5)
NEUTROPHILS NFR BLD AUTO: 66.7 % — SIGNIFICANT CHANGE UP (ref 42.2–75.2)
NRBC # BLD: 0 /100 WBCS — SIGNIFICANT CHANGE UP (ref 0–0)
PHOSPHATE SERPL-MCNC: 3.9 MG/DL — SIGNIFICANT CHANGE UP (ref 2.1–4.9)
PLATELET # BLD AUTO: 199 K/UL — SIGNIFICANT CHANGE UP (ref 130–400)
PMV BLD: 11.5 FL — HIGH (ref 7.4–10.4)
POTASSIUM SERPL-MCNC: 5 MMOL/L — SIGNIFICANT CHANGE UP (ref 3.5–5)
POTASSIUM SERPL-SCNC: 5 MMOL/L — SIGNIFICANT CHANGE UP (ref 3.5–5)
RBC # BLD: 4.75 M/UL — SIGNIFICANT CHANGE UP (ref 4.7–6.1)
RBC # FLD: 14 % — SIGNIFICANT CHANGE UP (ref 11.5–14.5)
SODIUM SERPL-SCNC: 136 MMOL/L — SIGNIFICANT CHANGE UP (ref 135–146)
WBC # BLD: 11.78 K/UL — HIGH (ref 4.8–10.8)
WBC # FLD AUTO: 11.78 K/UL — HIGH (ref 4.8–10.8)

## 2024-07-14 PROCEDURE — 99232 SBSQ HOSP IP/OBS MODERATE 35: CPT

## 2024-07-14 RX ADMIN — Medication 500 MILLIGRAM(S): at 13:05

## 2024-07-14 RX ADMIN — TAMSULOSIN HYDROCHLORIDE 0.8 MILLIGRAM(S): 0.4 CAPSULE ORAL at 21:37

## 2024-07-14 RX ADMIN — Medication 975 MILLIGRAM(S): at 18:42

## 2024-07-14 RX ADMIN — Medication 975 MILLIGRAM(S): at 05:14

## 2024-07-14 RX ADMIN — Medication 2 TABLET(S): at 21:38

## 2024-07-14 RX ADMIN — ATORVASTATIN CALCIUM 20 MILLIGRAM(S): 20 TABLET, FILM COATED ORAL at 21:36

## 2024-07-14 RX ADMIN — Medication 100 MILLIGRAM(S): at 05:15

## 2024-07-14 RX ADMIN — LIDOCAINE HCL 1 PATCH: 28 GEL TOPICAL at 11:27

## 2024-07-14 RX ADMIN — Medication 975 MILLIGRAM(S): at 17:25

## 2024-07-14 RX ADMIN — LIDOCAINE HCL 1 PATCH: 28 GEL TOPICAL at 18:42

## 2024-07-14 RX ADMIN — Medication 975 MILLIGRAM(S): at 12:00

## 2024-07-14 RX ADMIN — Medication 25 MILLIGRAM(S): at 17:26

## 2024-07-14 RX ADMIN — Medication 25 MILLIGRAM(S): at 05:15

## 2024-07-14 RX ADMIN — VALSARTAN 40 MILLIGRAM(S): 320 TABLET ORAL at 05:15

## 2024-07-14 RX ADMIN — ENOXAPARIN SODIUM 30 MILLIGRAM(S): 100 INJECTION SUBCUTANEOUS at 17:25

## 2024-07-14 RX ADMIN — Medication 500 MILLIGRAM(S): at 05:15

## 2024-07-14 RX ADMIN — INSULIN LISPRO 4: 100 INJECTION, SOLUTION SUBCUTANEOUS at 17:26

## 2024-07-14 RX ADMIN — Medication 1 APPLICATION(S): at 05:22

## 2024-07-14 RX ADMIN — INSULIN LISPRO 2: 100 INJECTION, SOLUTION SUBCUTANEOUS at 11:26

## 2024-07-14 RX ADMIN — INSULIN LISPRO 2: 100 INJECTION, SOLUTION SUBCUTANEOUS at 21:44

## 2024-07-14 RX ADMIN — Medication 100 MILLIGRAM(S): at 13:05

## 2024-07-14 RX ADMIN — Medication 100 MILLIGRAM(S): at 21:36

## 2024-07-14 RX ADMIN — Medication 500 MILLIGRAM(S): at 21:36

## 2024-07-14 RX ADMIN — Medication 975 MILLIGRAM(S): at 11:27

## 2024-07-14 RX ADMIN — ENOXAPARIN SODIUM 30 MILLIGRAM(S): 100 INJECTION SUBCUTANEOUS at 05:16

## 2024-07-14 NOTE — CHART NOTE - NSCHARTNOTEFT_GEN_A_CORE
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 The pt has a mobility limitation that significantly impairs the pts ability to participate in one or more MRADLs such as toileting, eating, dressing, and bathing in customary locations in the home. The pts home provides adequate access between rooms for the use of the rolling walker.  The rolling walker will significantly improve the pts ability to participate in MRADLs and will be used on a regular basis in the home.

## 2024-07-14 NOTE — PROGRESS NOTE ADULT - SUBJECTIVE AND OBJECTIVE BOX
GENERAL SURGERY PROGRESS NOTE    Patient: CAROLINA VAGUHAN , 78y ([birth year])Male   MRN: [id number]  Location: [address]  Visit: 07-13-24 Inpatient  Date: 07-14-24 @ 00:32    Hospital Day #: 3  Post-Op Day #:    Procedure/Dx/Injuries: R Rib Fx 5-10 and Hemorrhagic ruptured renal cyst    Events of past 24 hours: Patient was downgraded from SICU to the floors. Potassium downtrended to 4.6 from 5.1. No acute events overnight. Patient pulling 1 L on incentive spirometer.     PAST MEDICAL & SURGICAL HISTORY:  Diabetes      HTN (hypertension)      HLD (hyperlipidemia)      Acute myocardial infarction      Blindness of right eye      S/P CABG x 4          Vitals:   T(F): 98 (07-13-24 @ 23:53), Max: 98.4 (07-13-24 @ 08:18)  HR: 65 (07-13-24 @ 23:53)  BP: 120/70 (07-13-24 @ 23:53)  RR: 18 (07-13-24 @ 23:53)  SpO2: 96% (07-13-24 @ 23:53)      Diet, DASH/TLC:   Sodium & Cholesterol Restricted      Fluids:     I & O's:    07-12-24 @ 07:01  -  07-13-24 @ 07:00  --------------------------------------------------------  IN:    IV PiggyBack: 100 mL    Lactated Ringers: 330 mL  Total IN: 430 mL    OUT:  Total OUT: 0 mL    Total NET: 430 mL      PHYSICAL EXAM:  General: NAD, AAOx3, calm and cooperative  Respiratory: Normal respiratory effort. Satting 96% on RA  Abdomen: Soft, nondistended, nontender  Skin: Warm/dry, normal color, no jaundice      MEDICATIONS  (STANDING):  acetaminophen     Tablet .. 975 milliGRAM(s) Oral every 6 hours  atorvastatin 20 milliGRAM(s) Oral at bedtime  chlorhexidine 2% Cloths 1 Application(s) Topical <User Schedule>  enoxaparin Injectable 30 milliGRAM(s) SubCutaneous every 12 hours  gabapentin 100 milliGRAM(s) Oral every 8 hours  insulin lispro (ADMELOG) corrective regimen sliding scale   SubCutaneous Before meals and at bedtime  lidocaine   4% Patch 1 Patch Transdermal daily  methocarbamol 500 milliGRAM(s) Oral every 8 hours  metoprolol tartrate 25 milliGRAM(s) Oral every 12 hours  senna 2 Tablet(s) Oral at bedtime  tamsulosin 0.8 milliGRAM(s) Oral at bedtime  valsartan 40 milliGRAM(s) Oral daily    MEDICATIONS  (PRN):      DVT PROPHYLAXIS: enoxaparin Injectable 30 milliGRAM(s) SubCutaneous every 12 hours    GI PROPHYLAXIS:   ANTICOAGULATION:   ANTIBIOTICS:            LAB/STUDIES:  Labs:  CAPILLARY BLOOD GLUCOSE      POCT Blood Glucose.: 195 mg/dL (13 Jul 2024 21:19)  POCT Blood Glucose.: 177 mg/dL (13 Jul 2024 16:36)  POCT Blood Glucose.: 167 mg/dL (13 Jul 2024 11:36)  POCT Blood Glucose.: 150 mg/dL (13 Jul 2024 07:35)                          12.9   9.25  )-----------( 184      ( 13 Jul 2024 20:00 )             37.9       Auto Neutrophil %: 63.1 % (07-13-24 @ 20:00)  Auto Immature Granulocyte %: 0.2 % (07-13-24 @ 20:00)  Auto Neutrophil %: 83.4 % (07-13-24 @ 02:05)  Auto Immature Granulocyte %: 0.5 % (07-13-24 @ 02:05)    07-13    138  |  105  |  19  ----------------------------<  213<H>  4.6   |  22  |  1.2      Calcium: 9.0 mg/dL (07-13-24 @ 20:00)      LFTs:             7.1  | 0.5  | 29       ------------------[53      ( 12 Jul 2024 19:52 )  4.5  | x    | 26          Lipase:x      Amylase:x             Coags:            Urinalysis Basic - ( 13 Jul 2024 20:00 )    Color: x / Appearance: x / SG: x / pH: x  Gluc: 213 mg/dL / Ketone: x  / Bili: x / Urobili: x   Blood: x / Protein: x / Nitrite: x   Leuk Esterase: x / RBC: x / WBC x   Sq Epi: x / Non Sq Epi: x / Bacteria: x                IMAGING:  n/a

## 2024-07-14 NOTE — PROGRESS NOTE ADULT - ASSESSMENT
ASSESSMENT:  78y M w/ PMHx of 78y Male s/p mechanical fall with R 5-10 rib fractures, and R renal hemorrhagic lesion. Patient was downgraded to the floors from the SICU    PLAN:  - Pain management  - Encourage incentive spirometer use  - Monitor vitals  - Monitor electrolytes and replete as necessary  - Continue DVT ppx and GI ppx      Trauma [id number]

## 2024-07-14 NOTE — PROGRESS NOTE ADULT - ATTENDING COMMENTS
Patient complains of some pain in right chest wall, but slightly better today.  On IS able to do 1L.  Hb stable.    ASSESSMENT:  77 y/o male, S/P Fall.  Closed Right 5-10th Rib Fractures.  Right Renal Hematoma.  Acute pain due to trauma.  At risk for hemodynamic instability.  Atelectasis.  CAD. DM.    PLAN:  - pain control - use Tylenol, Gabapentin, Robaxin  - encourage incentive spirometer  - keep normotensive - on Valsartan 40 mg and Metoprolol 25 mg bid  - carb consistent diet  - needs FS AC and HS; on ISS  - follow serum electrolytes and UOP  - ID - universal precaution  - DVT prophylaxis  - PT  Case management eval needed.

## 2024-07-15 ENCOUNTER — TRANSCRIPTION ENCOUNTER (OUTPATIENT)
Age: 79
End: 2024-07-15

## 2024-07-15 VITALS
RESPIRATION RATE: 18 BRPM | HEART RATE: 96 BPM | DIASTOLIC BLOOD PRESSURE: 72 MMHG | OXYGEN SATURATION: 96 % | SYSTOLIC BLOOD PRESSURE: 119 MMHG | TEMPERATURE: 98 F

## 2024-07-15 LAB
GLUCOSE BLDC GLUCOMTR-MCNC: 157 MG/DL — HIGH (ref 70–99)
GLUCOSE BLDC GLUCOMTR-MCNC: 195 MG/DL — HIGH (ref 70–99)
GLUCOSE BLDC GLUCOMTR-MCNC: 220 MG/DL — HIGH (ref 70–99)
GLUCOSE BLDC GLUCOMTR-MCNC: 225 MG/DL — HIGH (ref 70–99)
GLUCOSE BLDC GLUCOMTR-MCNC: 245 MG/DL — HIGH (ref 70–99)

## 2024-07-15 PROCEDURE — 99232 SBSQ HOSP IP/OBS MODERATE 35: CPT

## 2024-07-15 RX ORDER — TAMSULOSIN HYDROCHLORIDE 0.4 MG/1
2 CAPSULE ORAL
Qty: 14 | Refills: 0
Start: 2024-07-15 | End: 2024-07-21

## 2024-07-15 RX ORDER — TAMSULOSIN HYDROCHLORIDE 0.4 MG/1
2 CAPSULE ORAL
Qty: 0 | Refills: 0 | DISCHARGE
Start: 2024-07-15

## 2024-07-15 RX ADMIN — VALSARTAN 40 MILLIGRAM(S): 320 TABLET ORAL at 05:21

## 2024-07-15 RX ADMIN — ENOXAPARIN SODIUM 30 MILLIGRAM(S): 100 INJECTION SUBCUTANEOUS at 17:10

## 2024-07-15 RX ADMIN — Medication 500 MILLIGRAM(S): at 14:13

## 2024-07-15 RX ADMIN — Medication 975 MILLIGRAM(S): at 05:21

## 2024-07-15 RX ADMIN — Medication 500 MILLIGRAM(S): at 05:22

## 2024-07-15 RX ADMIN — INSULIN LISPRO 4: 100 INJECTION, SOLUTION SUBCUTANEOUS at 11:47

## 2024-07-15 RX ADMIN — ENOXAPARIN SODIUM 30 MILLIGRAM(S): 100 INJECTION SUBCUTANEOUS at 05:21

## 2024-07-15 RX ADMIN — Medication 25 MILLIGRAM(S): at 05:22

## 2024-07-15 RX ADMIN — Medication 975 MILLIGRAM(S): at 17:10

## 2024-07-15 RX ADMIN — INSULIN LISPRO 4: 100 INJECTION, SOLUTION SUBCUTANEOUS at 17:00

## 2024-07-15 RX ADMIN — Medication 975 MILLIGRAM(S): at 11:46

## 2024-07-15 RX ADMIN — INSULIN LISPRO 2: 100 INJECTION, SOLUTION SUBCUTANEOUS at 08:18

## 2024-07-15 RX ADMIN — LIDOCAINE HCL 1 PATCH: 28 GEL TOPICAL at 00:43

## 2024-07-15 RX ADMIN — Medication 1 APPLICATION(S): at 05:24

## 2024-07-15 RX ADMIN — Medication 100 MILLIGRAM(S): at 14:13

## 2024-07-15 RX ADMIN — LIDOCAINE HCL 1 PATCH: 28 GEL TOPICAL at 11:48

## 2024-07-15 RX ADMIN — Medication 100 MILLIGRAM(S): at 05:22

## 2024-07-15 NOTE — DISCHARGE NOTE PROVIDER - CARE PROVIDER_API CALL
Leno Charles  Urology  [address] [place] [address], [address]  [place] [address]  Phone: [phone]  Fax: [phone]  Follow Up Time: 1 week

## 2024-07-15 NOTE — PROGRESS NOTE ADULT - ATTENDING COMMENTS
Patient using IS.  Pain is slightly better.  Still has bilateral atelectasis.    ASSESSMENT:  79 y/o male, S/P Fall.  Closed Right 5-10th Rib Fractures.  Right Renal Hematoma.  Acute pain due to trauma.  At risk for hemodynamic instability.  Atelectasis.  CAD. DM.    PLAN:  - use Tylenol and Gabapentin prn  - needs aggressive PT for OOB  - aspiration precautions at all time  - DVT prophylaxis  Case management evaluation needed

## 2024-07-15 NOTE — PROGRESS NOTE ADULT - ASSESSMENT
ASSESSMENT:  78y M w/ PMHx of 78y Male s/p mechanical fall with R 5-10 rib fractures, and R renal hemorrhagic lesion. Patient was downgraded to the floors from the SICU    Plan:   - Pain control   - Encourage IS   - Encourage ambulation   - PT: home PT   - Hemodynamic monitoring   - Dispo planning     [phone]

## 2024-07-15 NOTE — DISCHARGE NOTE PROVIDER - NSDCMRMEDTOKEN_GEN_ALL_CORE_FT
atorvastatin 20 mg oral tablet: 1 tab(s) orally once a day (at bedtime)  metFORMIN 1000 mg oral tablet: 1 tab(s) orally 2 times a day  metoprolol succinate 50 mg oral capsule, extended release: 1 cap(s) orally once a day  tamsulosin 0.4 mg oral capsule: 2 cap(s) orally once a day (at bedtime)  valsartan 40 mg oral tablet: 1 tab(s) orally once a day

## 2024-07-15 NOTE — PROGRESS NOTE ADULT - SUBJECTIVE AND OBJECTIVE BOX
GENERAL SURGERY PROGRESS NOTE    Patient: CAROLINA VAUGHAN , 78y ([birth year])Male   MRN: [id number]  Location: [address]  Visit: 07-13-24 Inpatient  Date: 07-15-24 @ 09:33    Hospital Day #: 4  Events of past 24 hours: tolerating regular diet. pain is controlled     PAST MEDICAL & SURGICAL HISTORY:  Diabetes      HTN (hypertension)      HLD (hyperlipidemia)      Acute myocardial infarction      Blindness of right eye      S/P CABG x 4          Vitals:   T(F): 97.9 (07-15-24 @ 08:52), Max: 98.2 (07-14-24 @ 22:31)  HR: 85 (07-15-24 @ 08:52)  BP: 131/79 (07-15-24 @ 08:52)  RR: 18 (07-15-24 @ 08:52)  SpO2: 96% (07-15-24 @ 08:52)      Diet, DASH/TLC:   Sodium & Cholesterol Restricted      Fluids:     I & O's:    07-14-24 @ 07:01  -  07-15-24 @ 07:00  --------------------------------------------------------  IN:  Total IN: 0 mL    OUT:    Voided (mL): 600 mL  Total OUT: 600 mL    Total NET: -600 mL    PHYSICAL EXAM:  General: NAD, AAOx3, calm and cooperative  HEENT: NCAT, SABIHA, EOMI,  Respiratory: Normal respiratory effort  Abdomen: Soft, non-distended, non-tender  Skin: Warm/dry, normal color,    MEDICATIONS  (STANDING):  acetaminophen     Tablet .. 975 milliGRAM(s) Oral every 6 hours  atorvastatin 20 milliGRAM(s) Oral at bedtime  chlorhexidine 2% Cloths 1 Application(s) Topical <User Schedule>  enoxaparin Injectable 30 milliGRAM(s) SubCutaneous every 12 hours  gabapentin 100 milliGRAM(s) Oral every 8 hours  insulin lispro (ADMELOG) corrective regimen sliding scale   SubCutaneous Before meals and at bedtime  lidocaine   4% Patch 1 Patch Transdermal daily  methocarbamol 500 milliGRAM(s) Oral every 8 hours  metoprolol tartrate 25 milliGRAM(s) Oral every 12 hours  senna 2 Tablet(s) Oral at bedtime  tamsulosin 0.8 milliGRAM(s) Oral at bedtime  valsartan 40 milliGRAM(s) Oral daily    MEDICATIONS  (PRN):      DVT PROPHYLAXIS: enoxaparin Injectable 30 milliGRAM(s) SubCutaneous every 12 hours    GI PROPHYLAXIS:   ANTICOAGULATION:   ANTIBIOTICS:            LAB/STUDIES:  Labs:  CAPILLARY BLOOD GLUCOSE      POCT Blood Glucose.: 157 mg/dL (15 Jul 2024 07:45)  POCT Blood Glucose.: 153 mg/dL (14 Jul 2024 21:41)  POCT Blood Glucose.: 217 mg/dL (14 Jul 2024 17:21)  POCT Blood Glucose.: 194 mg/dL (14 Jul 2024 11:18)                          14.7   11.78 )-----------( 199      ( 14 Jul 2024 21:57 )             43.8       Auto Neutrophil %: 66.7 % (07-14-24 @ 21:57)  Auto Immature Granulocyte %: 0.3 % (07-14-24 @ 21:57)    07-14    136  |  100  |  18  ----------------------------<  166<H>  5.0   |  18  |  1.2      Calcium: 9.7 mg/dL (07-14-24 @ 21:57)      LFTs:         Coags:            Urinalysis Basic - ( 14 Jul 2024 21:57 )    Color: x / Appearance: x / SG: x / pH: x  Gluc: 166 mg/dL / Ketone: x  / Bili: x / Urobili: x   Blood: x / Protein: x / Nitrite: x   Leuk Esterase: x / RBC: x / WBC x   Sq Epi: x / Non Sq Epi: x / Bacteria: x                IMAGING:

## 2024-07-15 NOTE — PROGRESS NOTE ADULT - REASON FOR ADMISSION
S/P Fall - Multiple Rib Fractures

## 2024-07-15 NOTE — DISCHARGE NOTE NURSING/CASE MANAGEMENT/SOCIAL WORK - PATIENT PORTAL LINK FT
You can access the FollowMyHealth Patient Portal offered by [facility] by registering at the following website: [email or web address]. By joining [facility]’s FollowMyHealth portal, you will also be able to view your health information using other applications (apps) compatible with our system.

## 2024-07-15 NOTE — DISCHARGE NOTE PROVIDER - NSDCCPCAREPLAN_GEN_ALL_CORE_FT
PRINCIPAL DISCHARGE DIAGNOSIS  Diagnosis: Rib fractures  Assessment and Plan of Treatment: Activity: As tolerated. Please continue using incentive spirometer daily.   Pain control: You may take over-the-counter tylenol and motrin three times per day with food for up to 3 days.  Follow up: Please call the number provided to make an appointment with Dr. Charles (urology) in 1-2 weeks. Please call with any questions or concerns including fevers, worsening pain, shortness of breath, severe chest pain.

## 2024-07-15 NOTE — DISCHARGE NOTE PROVIDER - HOSPITAL COURSE
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 +-HT, -LOC, -AC. Trauma assessment in ED: ABCs intact , GCS 15 , AAOx3. Pt states around 3pm he was in the shower when he slipped and fell on soap. Pt states he fell on his right side hitting the edge of the bathtub. Pt states he immediately felt " the wind come out of him" and states he had trouble breathing. Patient was able to get up right after the fall and call for help. Patient lives with his wife. Pt denies any headache, dizziness, nausea, vomiting, chest pain, or any other symptoms.  Panscan reveals acute displaced right 5-10 lateral rib fractures, no PTX and hemorrhagic ruptured renal cyst. Patient admitted to trauma, monitored in SICU. Throughout SICU course, patient remained hemodynamically stable. Physiatry recommend STR, PT recommend home PT. Patient amenable to home PT and rolling walker. Pain has been controlled. Patient is ambulating, tolerating diet. Patient is medically appropriate for discharge.

## 2024-07-24 DIAGNOSIS — E87.5 HYPERKALEMIA: ICD-10-CM

## 2024-07-24 DIAGNOSIS — N40.0 BENIGN PROSTATIC HYPERPLASIA WITHOUT LOWER URINARY TRACT SYMPTOMS: ICD-10-CM

## 2024-07-24 DIAGNOSIS — R54 AGE-RELATED PHYSICAL DEBILITY: ICD-10-CM

## 2024-07-24 DIAGNOSIS — I25.10 ATHEROSCLEROTIC HEART DISEASE OF NATIVE CORONARY ARTERY WITHOUT ANGINA PECTORIS: ICD-10-CM

## 2024-07-24 DIAGNOSIS — Z91.148 PATIENT'S OTHER NONCOMPLIANCE WITH MEDICATION REGIMEN FOR OTHER REASON: ICD-10-CM

## 2024-07-24 DIAGNOSIS — E11.9 TYPE 2 DIABETES MELLITUS WITHOUT COMPLICATIONS: ICD-10-CM
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
## 2024-07-24 DIAGNOSIS — J98.11 ATELECTASIS: ICD-10-CM

## 2024-07-24 DIAGNOSIS — N28.1 CYST OF KIDNEY, ACQUIRED: ICD-10-CM

## 2024-07-24 DIAGNOSIS — I10 ESSENTIAL (PRIMARY) HYPERTENSION: ICD-10-CM

## 2024-07-24 DIAGNOSIS — E83.42 HYPOMAGNESEMIA: ICD-10-CM

## 2024-07-24 DIAGNOSIS — S22.41XA MULTIPLE FRACTURES OF RIBS, RIGHT SIDE, INITIAL ENCOUNTER FOR CLOSED FRACTURE: ICD-10-CM

## 2024-07-24 DIAGNOSIS — Z95.1 PRESENCE OF AORTOCORONARY BYPASS GRAFT: ICD-10-CM

## 2024-07-24 DIAGNOSIS — I25.2 OLD MYOCARDIAL INFARCTION: ICD-10-CM
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
## 2024-07-24 DIAGNOSIS — S37.011A MINOR CONTUSION OF RIGHT KIDNEY, INITIAL ENCOUNTER: ICD-10-CM

## 2024-07-29 ENCOUNTER — RX RENEWAL (OUTPATIENT)
Age: 79
End: 2024-07-29

## 2024-10-03 NOTE — ED PROVIDER NOTE - MDM ORDERS SUBMITTED SELECTION
[FreeTextEntry1] : Documented by Aaliyah Martinez acting as a scribe for Dr. Sandi Zabala. 10/03/2024   All medical record entries made by the scribe were at my, Dr. Sandi Zabala, direction and personally dictated by me on 10/03/2024. I have reviewed the chart and agree that the record accurately reflects my personal performance of the history, physical exam, assessment and plan. I have also personally directed, reviewed, and agreed with the chart. Imaging Studies/Medications

## 2024-10-07 ENCOUNTER — APPOINTMENT (OUTPATIENT)
Dept: CARDIOLOGY | Facility: CLINIC | Age: 79
End: 2024-10-07
Payer: MEDICARE

## 2024-10-07 ENCOUNTER — NON-APPOINTMENT (OUTPATIENT)
Age: 79
End: 2024-10-07

## 2024-10-07 VITALS
HEART RATE: 90 BPM | WEIGHT: 175 LBS | DIASTOLIC BLOOD PRESSURE: 82 MMHG | OXYGEN SATURATION: 96 % | SYSTOLIC BLOOD PRESSURE: 122 MMHG | HEIGHT: 67 IN | BODY MASS INDEX: 27.47 KG/M2

## 2024-10-07 DIAGNOSIS — R94.39 ABNORMAL RESULT OF OTHER CARDIOVASCULAR FUNCTION STUDY: ICD-10-CM

## 2024-10-07 DIAGNOSIS — E11.9 TYPE 2 DIABETES MELLITUS W/OUT COMPLICATIONS: ICD-10-CM

## 2024-10-07 DIAGNOSIS — I51.9 HEART DISEASE, UNSPECIFIED: ICD-10-CM

## 2024-10-07 DIAGNOSIS — I65.23 OCCLUSION AND STENOSIS OF BILATERAL CAROTID ARTERIES: ICD-10-CM

## 2024-10-07 DIAGNOSIS — Z91.199 PATIENT'S NONCOMPLIANCE WITH OTHER MEDICAL TREATMENT AND REGIMEN DUE TO UNSPECIFIED REASON: ICD-10-CM

## 2024-10-07 DIAGNOSIS — E78.2 MIXED HYPERLIPIDEMIA: ICD-10-CM

## 2024-10-07 DIAGNOSIS — Z00.00 ENCOUNTER FOR GENERAL ADULT MEDICAL EXAMINATION W/OUT ABNORMAL FINDINGS: ICD-10-CM

## 2024-10-07 DIAGNOSIS — I25.810 ATHEROSCLEROSIS OF CORONARY ARTERY BYPASS GRAFT(S) W/OUT ANGINA PECTORIS: ICD-10-CM

## 2024-10-07 DIAGNOSIS — I10 ESSENTIAL (PRIMARY) HYPERTENSION: ICD-10-CM

## 2024-10-07 DIAGNOSIS — I25.2 OLD MYOCARDIAL INFARCTION: ICD-10-CM

## 2024-10-07 DIAGNOSIS — I51.7 CARDIOMEGALY: ICD-10-CM

## 2024-10-07 PROCEDURE — 99204 OFFICE O/P NEW MOD 45 MIN: CPT

## 2024-10-07 PROCEDURE — G2211 COMPLEX E/M VISIT ADD ON: CPT

## 2024-10-07 PROCEDURE — 93000 ELECTROCARDIOGRAM COMPLETE: CPT

## 2024-10-29 ENCOUNTER — RX RENEWAL (OUTPATIENT)
Age: 79
End: 2024-10-29

## 2024-12-26 ENCOUNTER — APPOINTMENT (OUTPATIENT)
Dept: CARDIOLOGY | Facility: CLINIC | Age: 79
End: 2024-12-26

## 2025-01-07 ENCOUNTER — APPOINTMENT (OUTPATIENT)
Dept: CARDIOLOGY | Facility: CLINIC | Age: 80
End: 2025-01-07

## 2025-01-07 DIAGNOSIS — E78.2 MIXED HYPERLIPIDEMIA: ICD-10-CM

## 2025-01-07 DIAGNOSIS — I25.2 OLD MYOCARDIAL INFARCTION: ICD-10-CM

## 2025-01-07 DIAGNOSIS — I51.7 CARDIOMEGALY: ICD-10-CM

## 2025-01-07 DIAGNOSIS — I65.23 OCCLUSION AND STENOSIS OF BILATERAL CAROTID ARTERIES: ICD-10-CM

## 2025-01-07 DIAGNOSIS — I10 ESSENTIAL (PRIMARY) HYPERTENSION: ICD-10-CM

## 2025-01-07 DIAGNOSIS — I51.9 HEART DISEASE, UNSPECIFIED: ICD-10-CM

## 2025-01-07 DIAGNOSIS — I25.810 ATHEROSCLEROSIS OF CORONARY ARTERY BYPASS GRAFT(S) W/OUT ANGINA PECTORIS: ICD-10-CM

## 2025-01-07 DIAGNOSIS — E11.9 TYPE 2 DIABETES MELLITUS W/OUT COMPLICATIONS: ICD-10-CM

## 2025-02-03 NOTE — PATIENT PROFILE ADULT - LIVING ENVIRONMENT
Forms completed and faxed to [phone]  
OCCUPATIONAL THERAPY /SPEECH THERAPY orders received via fax. Form in your mailbox to be signed.    
no

## 2025-06-20 ENCOUNTER — APPOINTMENT (OUTPATIENT)
Dept: CARDIOLOGY | Facility: CLINIC | Age: 80
End: 2025-06-20
Payer: MEDICARE

## 2025-06-20 ENCOUNTER — NON-APPOINTMENT (OUTPATIENT)
Age: 80
End: 2025-06-20

## 2025-06-20 VITALS
WEIGHT: 165 LBS | DIASTOLIC BLOOD PRESSURE: 71 MMHG | BODY MASS INDEX: 25.9 KG/M2 | HEART RATE: 69 BPM | SYSTOLIC BLOOD PRESSURE: 124 MMHG | HEIGHT: 67 IN

## 2025-06-20 PROBLEM — R00.2 PALPITATIONS: Status: ACTIVE | Noted: 2025-06-20

## 2025-06-20 PROCEDURE — 93000 ELECTROCARDIOGRAM COMPLETE: CPT

## 2025-06-20 PROCEDURE — G2211 COMPLEX E/M VISIT ADD ON: CPT

## 2025-06-20 PROCEDURE — ZZZZZ: CPT

## 2025-06-20 PROCEDURE — 99214 OFFICE O/P EST MOD 30 MIN: CPT

## 2025-06-20 RX ORDER — EMPAGLIFLOZIN 10 MG/1
10 TABLET, FILM COATED ORAL DAILY
Qty: 90 | Refills: 3 | Status: ACTIVE | COMMUNITY
Start: 2025-06-20 | End: 1900-01-01

## 2025-06-20 RX ORDER — METFORMIN HYDROCHLORIDE 1000 MG/1
1000 TABLET, COATED ORAL
Qty: 180 | Refills: 3 | Status: ACTIVE | COMMUNITY
Start: 2025-06-20

## 2025-07-11 PROBLEM — I48.91 ATRIAL FIBRILLATION: Status: ACTIVE | Noted: 2025-07-11

## 2025-07-11 RX ORDER — APIXABAN 5 MG/1
5 TABLET, FILM COATED ORAL
Qty: 180 | Refills: 3 | Status: ACTIVE | COMMUNITY
Start: 2025-07-11 | End: 1900-01-01

## 2025-08-07 ENCOUNTER — APPOINTMENT (OUTPATIENT)
Dept: CARDIOLOGY | Facility: CLINIC | Age: 80
End: 2025-08-07

## 2025-08-21 ENCOUNTER — APPOINTMENT (OUTPATIENT)
Dept: CARDIOLOGY | Facility: CLINIC | Age: 80
End: 2025-08-21
Payer: MEDICARE

## 2025-08-21 VITALS
HEIGHT: 67 IN | SYSTOLIC BLOOD PRESSURE: 110 MMHG | DIASTOLIC BLOOD PRESSURE: 74 MMHG | WEIGHT: 158 LBS | BODY MASS INDEX: 24.8 KG/M2

## 2025-08-21 DIAGNOSIS — E11.9 TYPE 2 DIABETES MELLITUS W/OUT COMPLICATIONS: ICD-10-CM

## 2025-08-21 DIAGNOSIS — I48.91 UNSPECIFIED ATRIAL FIBRILLATION: ICD-10-CM

## 2025-08-21 DIAGNOSIS — I65.23 OCCLUSION AND STENOSIS OF BILATERAL CAROTID ARTERIES: ICD-10-CM

## 2025-08-21 DIAGNOSIS — I51.7 CARDIOMEGALY: ICD-10-CM

## 2025-08-21 DIAGNOSIS — I10 ESSENTIAL (PRIMARY) HYPERTENSION: ICD-10-CM

## 2025-08-21 DIAGNOSIS — I25.810 ATHEROSCLEROSIS OF CORONARY ARTERY BYPASS GRAFT(S) W/OUT ANGINA PECTORIS: ICD-10-CM

## 2025-08-21 DIAGNOSIS — E78.2 MIXED HYPERLIPIDEMIA: ICD-10-CM

## 2025-08-21 DIAGNOSIS — I25.2 OLD MYOCARDIAL INFARCTION: ICD-10-CM

## 2025-08-21 PROCEDURE — 99213 OFFICE O/P EST LOW 20 MIN: CPT | Mod: 93
